# Patient Record
Sex: FEMALE | Race: BLACK OR AFRICAN AMERICAN | NOT HISPANIC OR LATINO
[De-identification: names, ages, dates, MRNs, and addresses within clinical notes are randomized per-mention and may not be internally consistent; named-entity substitution may affect disease eponyms.]

---

## 2017-01-19 ENCOUNTER — APPOINTMENT (OUTPATIENT)
Dept: INTERNAL MEDICINE | Facility: CLINIC | Age: 67
End: 2017-01-19

## 2017-01-19 VITALS — HEART RATE: 65 BPM | OXYGEN SATURATION: 98 % | DIASTOLIC BLOOD PRESSURE: 78 MMHG | SYSTOLIC BLOOD PRESSURE: 162 MMHG

## 2017-01-19 VITALS — SYSTOLIC BLOOD PRESSURE: 142 MMHG | DIASTOLIC BLOOD PRESSURE: 80 MMHG

## 2017-01-19 VITALS — WEIGHT: 168 LBS | BODY MASS INDEX: 26.31 KG/M2

## 2017-01-19 DIAGNOSIS — Z00.00 ENCOUNTER FOR GENERAL ADULT MEDICAL EXAMINATION W/OUT ABNORMAL FINDINGS: ICD-10-CM

## 2017-01-19 DIAGNOSIS — E87.6 HYPOKALEMIA: ICD-10-CM

## 2017-01-27 LAB
ANION GAP SERPL CALC-SCNC: 13 MMOL/L
BUN SERPL-MCNC: 13 MG/DL
CALCIUM SERPL-MCNC: 9.9 MG/DL
CHLORIDE SERPL-SCNC: 104 MMOL/L
CO2 SERPL-SCNC: 28 MMOL/L
CREAT SERPL-MCNC: 1.12 MG/DL
GLUCOSE SERPL-MCNC: 100 MG/DL
POTASSIUM SERPL-SCNC: 4.5 MMOL/L
SODIUM SERPL-SCNC: 145 MMOL/L

## 2017-02-07 LAB
CHOLEST SERPL-MCNC: 186 MG/DL
CHOLEST/HDLC SERPL: 3.2 RATIO
HDLC SERPL-MCNC: 59 MG/DL
LDLC SERPL CALC-MCNC: 111 MG/DL
TRIGL SERPL-MCNC: 82 MG/DL

## 2017-03-28 LAB
BASOPHILS # BLD AUTO: 0.02 K/UL
BASOPHILS NFR BLD AUTO: 0.4 %
EOSINOPHIL # BLD AUTO: 0.15 K/UL
EOSINOPHIL NFR BLD AUTO: 2.9 %
HBA1C MFR BLD HPLC: 5.2 %
HCT VFR BLD CALC: 41.2 %
HGB BLD-MCNC: 13 G/DL
IMM GRANULOCYTES NFR BLD AUTO: 0.4 %
LYMPHOCYTES # BLD AUTO: 1.75 K/UL
LYMPHOCYTES NFR BLD AUTO: 33.4 %
MAN DIFF?: NORMAL
MCHC RBC-ENTMCNC: 31.5 PG
MCHC RBC-ENTMCNC: 31.6 GM/DL
MCV RBC AUTO: 99.8 FL
MONOCYTES # BLD AUTO: 0.37 K/UL
MONOCYTES NFR BLD AUTO: 7.1 %
NEUTROPHILS # BLD AUTO: 2.93 K/UL
NEUTROPHILS NFR BLD AUTO: 55.8 %
PLATELET # BLD AUTO: 216 K/UL
RBC # BLD: 4.13 M/UL
RBC # FLD: 13.5 %
WBC # FLD AUTO: 5.24 K/UL

## 2018-03-13 ENCOUNTER — RX RENEWAL (OUTPATIENT)
Age: 68
End: 2018-03-13

## 2018-03-13 RX ORDER — ATORVASTATIN CALCIUM 40 MG/1
40 TABLET, FILM COATED ORAL
Qty: 90 | Refills: 3 | Status: ACTIVE | COMMUNITY
Start: 2017-01-19 | End: 1900-01-01

## 2018-03-16 ENCOUNTER — RX RENEWAL (OUTPATIENT)
Age: 68
End: 2018-03-16

## 2018-06-05 ENCOUNTER — RX RENEWAL (OUTPATIENT)
Age: 68
End: 2018-06-05

## 2018-09-22 ENCOUNTER — EMERGENCY (EMERGENCY)
Facility: HOSPITAL | Age: 68
LOS: 1 days | Discharge: HOME | End: 2018-09-22
Attending: EMERGENCY MEDICINE | Admitting: EMERGENCY MEDICINE
Payer: MEDICARE

## 2018-09-22 VITALS
SYSTOLIC BLOOD PRESSURE: 188 MMHG | HEART RATE: 88 BPM | TEMPERATURE: 98 F | RESPIRATION RATE: 18 BRPM | OXYGEN SATURATION: 97 % | DIASTOLIC BLOOD PRESSURE: 87 MMHG

## 2018-09-22 PROCEDURE — 99283 EMERGENCY DEPT VISIT LOW MDM: CPT | Mod: GC

## 2018-09-22 PROCEDURE — 73564 X-RAY EXAM KNEE 4 OR MORE: CPT | Mod: 26,RT

## 2018-09-22 NOTE — ED PROVIDER NOTE - MEDICAL DECISION MAKING DETAILS
68F w/ anterior R knee point tenderness, no limited ROM; clinically not consistent w/ septic knee, more likely bursitis, possible tendinitis, unlikely cellulitis; possible arthritis component; will check knee xr, likely dc w/ close outpt f/u and OTC pain meds

## 2018-09-22 NOTE — ED ADULT NURSE NOTE - NSIMPLEMENTINTERV_GEN_ALL_ED
Implemented All Universal Safety Interventions:  [place] to call system. Call bell, personal items and telephone within reach. Instruct patient to call for assistance. Room bathroom lighting operational. Non-slip footwear when patient is off stretcher. Physically safe environment: no spills, clutter or unnecessary equipment. Stretcher in lowest position, wheels locked, appropriate side rails in place.

## 2018-09-22 NOTE — ED ADULT NURSE NOTE - OBJECTIVE STATEMENT
The patient is a 68y Female complaining of R knee pain, swelling x 3 days. Pmh of HIV, CVA with R sided weakness, ambulatory independently, htn. Denies trauma, injury to the site. Seen by MARIANELA Oliva  IV accessed.  labs sent.  Swelling +, warm to touch, tenderness on palpation on anterior knee noted.  X-ray done,  pending result.  Pt refused pain med. The patient is a 68y Female complaining of R knee pain, swelling x 3 days. Pmh of HIV, CVA with R sided weakness, ambulatory independently, htn. Denies trauma, injury to the site. Seen by MARIANELA Oliva  Swelling +, warm to touch, tenderness on palpation on anterior knee noted.  X-ray done,  pending result.  Pt refused pain med.

## 2018-09-22 NOTE — ED PROVIDER NOTE - OBJECTIVE STATEMENT
68F w/ pmh CVA (R hand residual deficit, on asa), HTN, HLD p/w R knee pain and swelling x 3 days.     Pt had R knee arthroscopy for torn ligament in 1970s, no other procedures since then. 68F w/ pmh CVA (R hand residual deficit, on asa), HTN, HLD p/w R knee pain and swelling x 3 days. Pt denies     Pt had R knee arthroscopy for torn ligament in 1970s, no other procedures since then. 68F w/ pmh CVA (R hand residual deficit, on asa), HTN, HLD p/w R knee pain and swelling x 3 days. Pt denies trauma, injury, strain, other issues. No other complaints. No fever, systemic sx. Eating, drinking, urinating, stooling normally w/out issue. No recent travel, medication change, illness, or hospitalization.     Pt had R knee arthroscopy for torn ligament in 1970s, no other procedures since then.

## 2018-09-22 NOTE — ED PROVIDER NOTE - PROGRESS NOTE DETAILS
Mehdi Morrissey PGY2: pt still declining pain meds in ED - pending xr read, will dc Mehdi Morrissey PGY2: knee XR read by ED attending - shows arthritic changes and osteoporosis, no evidence of erosions, fracture, dislocation; will dc w/ outtpt f/u

## 2018-09-22 NOTE — ED PROVIDER NOTE - PHYSICAL EXAMINATION
*GEN:   comfortable, in no acute distress, AOx3    ///    *EYES:   pupils equally round and reactive to light, extra-occular movements intact    ///    *HEENT:   airway patent, moist mucosal membranes    ///    *CV:   regular rate and rhythm    ///    *RESP:   clear to auscultation bilaterally, non-labored    ///    *ABD:   soft, non-tender    ///    *:   no cva/flank tenderness    ///    *MSK:   R knee point anterior pre-patellar tenderness; no other tenderness elsewhere in knee, no decreased ROM    ///    *SKIN:   dry, intact    ///    *NEURO:   AOx3, no focal weakness or loss of sensation, gait normal

## 2018-09-22 NOTE — ED PROVIDER NOTE - ATTENDING CONTRIBUTION TO CARE
I was physically present for the E/M service provided. I agree with above history, physical, and plan which I have reviewed and edited where appropriate. I was physically present for the key portions of the service provided.    68F w/ pmh CVA (R hand residual deficit, on asa), HTN, HLD p/w R knee pain and swelling x 3 days. pt states no trauma, no fever, no instrumentation.  pt otherwise at baseline.    *GEN:   comfortable, in no apparent distress, AOx3  *EYES:   PERRL, extra-occular movements intact  *HEENT:   airway patent, moist mucosal membranes, uvula midline  *CV:   regular rate and rhythm, normal S1/S2, no murmur  *RESP:   clear to auscultation bilaterally, non-labored, speaking in full sentences  *ABD:   soft, non tender, no guarding  *MSK:   right lateral knee musculoskeletal tenderness has FROM with small area that is point tender, moving all extremity  *SKIN:   dry, intact, no rash  *NEURO:   AOx3, baseline right focal weakness or loss of sensation, gait normal to pt, GCS 15    a/p: atraumatic non-septic knee- likely arthritis r/o fracture vs dislocation vs contusion.  bursitis.  pt doesn't want any pain meds.  ace wrap and xr.  f/u with ortho   a/p:

## 2020-07-14 ENCOUNTER — INPATIENT (INPATIENT)
Facility: HOSPITAL | Age: 70
LOS: 2 days | Discharge: HOME | End: 2020-07-17
Attending: INTERNAL MEDICINE | Admitting: INTERNAL MEDICINE
Payer: MEDICARE

## 2020-07-14 VITALS
SYSTOLIC BLOOD PRESSURE: 162 MMHG | HEART RATE: 80 BPM | OXYGEN SATURATION: 100 % | RESPIRATION RATE: 18 BRPM | TEMPERATURE: 99 F | DIASTOLIC BLOOD PRESSURE: 94 MMHG

## 2020-07-14 LAB
ALBUMIN SERPL ELPH-MCNC: 4.1 G/DL — SIGNIFICANT CHANGE UP (ref 3.3–5)
ALP SERPL-CCNC: 71 U/L — SIGNIFICANT CHANGE UP (ref 40–120)
ALT FLD-CCNC: 9 U/L — SIGNIFICANT CHANGE UP (ref 4–33)
ANION GAP SERPL CALC-SCNC: 11 MMO/L — SIGNIFICANT CHANGE UP (ref 7–14)
APPEARANCE UR: CLEAR — SIGNIFICANT CHANGE UP
APTT BLD: 34.3 SEC — SIGNIFICANT CHANGE UP (ref 27–36.3)
AST SERPL-CCNC: 16 U/L — SIGNIFICANT CHANGE UP (ref 4–32)
BACTERIA # UR AUTO: NEGATIVE — SIGNIFICANT CHANGE UP
BASE EXCESS BLDV CALC-SCNC: 9.2 MMOL/L — SIGNIFICANT CHANGE UP
BASOPHILS # BLD AUTO: 0.03 K/UL — SIGNIFICANT CHANGE UP (ref 0–0.2)
BASOPHILS NFR BLD AUTO: 0.7 % — SIGNIFICANT CHANGE UP (ref 0–2)
BILIRUB SERPL-MCNC: 0.5 MG/DL — SIGNIFICANT CHANGE UP (ref 0.2–1.2)
BILIRUB UR-MCNC: NEGATIVE — SIGNIFICANT CHANGE UP
BLOOD GAS VENOUS - CREATININE: 1.15 MG/DL — SIGNIFICANT CHANGE UP (ref 0.5–1.3)
BLOOD GAS VENOUS - FIO2: 21 — SIGNIFICANT CHANGE UP
BLOOD UR QL VISUAL: SIGNIFICANT CHANGE UP
BUN SERPL-MCNC: 19 MG/DL — SIGNIFICANT CHANGE UP (ref 7–23)
CALCIUM SERPL-MCNC: 10 MG/DL — SIGNIFICANT CHANGE UP (ref 8.4–10.5)
CHLORIDE BLDV-SCNC: 103 MMOL/L — SIGNIFICANT CHANGE UP (ref 96–108)
CHLORIDE SERPL-SCNC: 102 MMOL/L — SIGNIFICANT CHANGE UP (ref 98–107)
CO2 SERPL-SCNC: 30 MMOL/L — SIGNIFICANT CHANGE UP (ref 22–31)
COLOR SPEC: SIGNIFICANT CHANGE UP
CREAT SERPL-MCNC: 1.1 MG/DL — SIGNIFICANT CHANGE UP (ref 0.5–1.3)
EOSINOPHIL # BLD AUTO: 0.09 K/UL — SIGNIFICANT CHANGE UP (ref 0–0.5)
EOSINOPHIL NFR BLD AUTO: 2 % — SIGNIFICANT CHANGE UP (ref 0–6)
GAS PNL BLDV: 144 MMOL/L — SIGNIFICANT CHANGE UP (ref 136–146)
GLUCOSE BLDV-MCNC: 122 MG/DL — HIGH (ref 70–99)
GLUCOSE SERPL-MCNC: 124 MG/DL — HIGH (ref 70–99)
GLUCOSE UR-MCNC: NEGATIVE — SIGNIFICANT CHANGE UP
HCO3 BLDV-SCNC: 30 MMOL/L — HIGH (ref 20–27)
HCT VFR BLD CALC: 37 % — SIGNIFICANT CHANGE UP (ref 34.5–45)
HCT VFR BLDV CALC: 39.6 % — SIGNIFICANT CHANGE UP (ref 34.5–45)
HGB BLD-MCNC: 12.3 G/DL — SIGNIFICANT CHANGE UP (ref 11.5–15.5)
HGB BLDV-MCNC: 12.9 G/DL — SIGNIFICANT CHANGE UP (ref 11.5–15.5)
HYALINE CASTS # UR AUTO: NEGATIVE — SIGNIFICANT CHANGE UP
IMM GRANULOCYTES NFR BLD AUTO: 0 % — SIGNIFICANT CHANGE UP (ref 0–1.5)
INR BLD: 1.13 — SIGNIFICANT CHANGE UP (ref 0.88–1.17)
KETONES UR-MCNC: NEGATIVE — SIGNIFICANT CHANGE UP
LACTATE BLDV-MCNC: 1.5 MMOL/L — SIGNIFICANT CHANGE UP (ref 0.5–2)
LEUKOCYTE ESTERASE UR-ACNC: NEGATIVE — SIGNIFICANT CHANGE UP
LYMPHOCYTES # BLD AUTO: 1.52 K/UL — SIGNIFICANT CHANGE UP (ref 1–3.3)
LYMPHOCYTES # BLD AUTO: 34.4 % — SIGNIFICANT CHANGE UP (ref 13–44)
MCHC RBC-ENTMCNC: 31.1 PG — SIGNIFICANT CHANGE UP (ref 27–34)
MCHC RBC-ENTMCNC: 33.2 % — SIGNIFICANT CHANGE UP (ref 32–36)
MCV RBC AUTO: 93.7 FL — SIGNIFICANT CHANGE UP (ref 80–100)
MONOCYTES # BLD AUTO: 0.56 K/UL — SIGNIFICANT CHANGE UP (ref 0–0.9)
MONOCYTES NFR BLD AUTO: 12.7 % — SIGNIFICANT CHANGE UP (ref 2–14)
NEUTROPHILS # BLD AUTO: 2.22 K/UL — SIGNIFICANT CHANGE UP (ref 1.8–7.4)
NEUTROPHILS NFR BLD AUTO: 50.2 % — SIGNIFICANT CHANGE UP (ref 43–77)
NITRITE UR-MCNC: NEGATIVE — SIGNIFICANT CHANGE UP
NRBC # FLD: 0 K/UL — SIGNIFICANT CHANGE UP (ref 0–0)
PCO2 BLDV: 56 MMHG — HIGH (ref 41–51)
PH BLDV: 7.4 PH — SIGNIFICANT CHANGE UP (ref 7.32–7.43)
PH UR: 7.5 — SIGNIFICANT CHANGE UP (ref 5–8)
PLATELET # BLD AUTO: 173 K/UL — SIGNIFICANT CHANGE UP (ref 150–400)
PMV BLD: 10.3 FL — SIGNIFICANT CHANGE UP (ref 7–13)
PO2 BLDV: < 24 MMHG — LOW (ref 35–40)
POTASSIUM BLDV-SCNC: 3.3 MMOL/L — LOW (ref 3.4–4.5)
POTASSIUM SERPL-MCNC: 3.8 MMOL/L — SIGNIFICANT CHANGE UP (ref 3.5–5.3)
POTASSIUM SERPL-SCNC: 3.8 MMOL/L — SIGNIFICANT CHANGE UP (ref 3.5–5.3)
PROT SERPL-MCNC: 7.2 G/DL — SIGNIFICANT CHANGE UP (ref 6–8.3)
PROT UR-MCNC: NEGATIVE — SIGNIFICANT CHANGE UP
PROTHROM AB SERPL-ACNC: 12.9 SEC — SIGNIFICANT CHANGE UP (ref 9.8–13.1)
RBC # BLD: 3.95 M/UL — SIGNIFICANT CHANGE UP (ref 3.8–5.2)
RBC # FLD: 12.5 % — SIGNIFICANT CHANGE UP (ref 10.3–14.5)
RBC CASTS # UR COMP ASSIST: SIGNIFICANT CHANGE UP (ref 0–?)
SAO2 % BLDV: 23.9 % — LOW (ref 60–85)
SODIUM SERPL-SCNC: 143 MMOL/L — SIGNIFICANT CHANGE UP (ref 135–145)
SP GR SPEC: 1.02 — SIGNIFICANT CHANGE UP (ref 1–1.04)
SQUAMOUS # UR AUTO: SIGNIFICANT CHANGE UP
TROPONIN T, HIGH SENSITIVITY: 17 NG/L — SIGNIFICANT CHANGE UP (ref ?–14)
TROPONIN T, HIGH SENSITIVITY: 18 NG/L — SIGNIFICANT CHANGE UP (ref ?–14)
UROBILINOGEN FLD QL: NORMAL — SIGNIFICANT CHANGE UP
WBC # BLD: 4.42 K/UL — SIGNIFICANT CHANGE UP (ref 3.8–10.5)
WBC # FLD AUTO: 4.42 K/UL — SIGNIFICANT CHANGE UP (ref 3.8–10.5)
WBC UR QL: SIGNIFICANT CHANGE UP (ref 0–?)

## 2020-07-14 PROCEDURE — 71045 X-RAY EXAM CHEST 1 VIEW: CPT | Mod: 26

## 2020-07-14 PROCEDURE — 70450 CT HEAD/BRAIN W/O DYE: CPT | Mod: 26,59

## 2020-07-14 PROCEDURE — 70496 CT ANGIOGRAPHY HEAD: CPT | Mod: 26

## 2020-07-14 PROCEDURE — 70498 CT ANGIOGRAPHY NECK: CPT | Mod: 26

## 2020-07-14 NOTE — ED ADULT TRIAGE NOTE - HEART RATE (BEATS/MIN)
Will continue current insulin regimen for now. Will continue monitoring FS, log, will Follow up.  Patient counseled for compliance with consistent low carb diet. 80

## 2020-07-14 NOTE — ED PROVIDER NOTE - OBJECTIVE STATEMENT
Ms. Lopez is a 68 yo F with a  PMH significant for CVA (2011) with R hemiplegia, dysarthria, and R facial droop at baseline presenting with worsening slurred speech starting at 5:00pm. The daughter was concerned for a new stroke, and called 911. In transit, BP was reportedly systolic of 200. Code stroke was called in the ED. Ms. Lopez is a 68 yo F with a  PMH significant for CVA (2011) with R hemiplegia, dysarthria, and R facial droop at baseline presenting with worsening slurred speech starting at 5:00pm. The daughter reports worsening slurred speech, and called 911. The patient denies any worsening slurred speech and only reported feeling "over heated." In transit, BP was reportedly systolic of 200. Code stroke was called in the ED. She takes aspirin at home and no anticoagulation. Presently, she denies any new symptoms other than feeling warm at 5:00pm. She is pain free.

## 2020-07-14 NOTE — CONSULT NOTE ADULT - ASSESSMENT
Impression: Transient episode of ?dysarthria now back to baseline. Etiology likely secondary to recrudesence of chronic lacunar infarcts vs. TIA.     Plan:   [] Continue ASA, statin  [] No inpatient need for further imaging inpatient  [] Can follow up with Dr. Shaun Reaves at [phone] 69F w/ PMH and CVA in 2011 (L. posterior limb of internal capsule w/ residual R. hemiparesis and dysarthria in 2011, on ASA), HTN, HLD, presents w/ multiple transient episode of worsening of baseline dysarthria.     Impression: Multiple stereotyped transient episodes of isolated dysarthria which is non-localizing. Etiology uncertain, possibilities include recrudescence of chronic lacunar infarcts vs. TIA vs. focal seizures. All of her chronic infarcts appear subcortical and restricted to the white matter so theoretically shouldn't cause a seizure focus.      Plan:   [] Keep BP permissive up to 220/110 for 48 hours followed by gradual normotension over 2-3 days   [] MRI brain without contrast   [] TTE w/ bubble   [] Load Plavix 300mg now. Start ASA + Plavix for 3 weeks followed by ASA alone as per [facility] protocol.   [] Atorvastatin 80mg (titrate to LDL < 70)   [] Lovenox SQ for DVT prophylaxis   [] PT/OT; S/S evaluation   [] Veeg for 24 hours; Duration depending on initial findings   [] Can follow up with Dr. Shaun Reaves at [phone] 69F w/ PMH and CVA in 2011 (L. posterior limb of internal capsule w/ residual R. hemiparesis and dysarthria in 2011, on ASA), HTN, HLD, presents w/ multiple transient episode of worsening of baseline dysarthria. LKN 1700 2020. NIHSS 8 (largely influenced by chronic findings). Exam as below. CTH/CTA w/o acute findings.     Exam:   MS: Oriented x3. Fluent. Follows crossed commands. Very subtle labial dysarthria.   CN: VFF. EOMI. V1-V3 intact. Face symmetric. T/u midline.   Motor: RUE contracted. RLE drift. Full strength left.   Sensory: Decreased sensation to LT on the left leg, otherwise sensation intact   Coordination: No dysmetria on FNF or ataxia on HTS on left, unable to check right given plegia  Gait: Deferred    CTH 2020: No acute pathology. Chronic lacunar infarcts in the L. Lentiform and corona radiata, with a new infarct in the L. genu of internal capsule compared to 2011 MRI.   CTA h/n 2020: No LVO. No steno-occlusive disease.     Impression: Multiple stereotyped transient episodes of isolated dysarthria which is non-localizing. Etiology uncertain, possibilities include recrudescence of chronic lacunar infarcts vs. TIA vs. focal seizures. All of her chronic infarcts appear subcortical and restricted to the white matter so theoretically shouldn't cause a seizure focus. If stroke/TIA, mechanism likely .     Plan:   [] Keep BP permissive up to 220/110 for 48 hours followed by gradual normotension over 2-3 days   [] MRI brain without contrast   [] TTE w/ bubble   [] Load Plavix 300mg now. Start ASA + Plavix for 3 weeks followed by ASA alone as per [facility] protocol.   [] Atorvastatin 80mg (titrate to LDL < 70)   [] Lovenox SQ for DVT prophylaxis   [] PT/OT; S/S evaluation   [] Veeg for 24 hours; Duration depending on initial findings   [] Can follow up with Dr. Shaun Reaves at [phone]

## 2020-07-14 NOTE — ED PROVIDER NOTE - PROGRESS NOTE DETAILS
Patient is asymptomatic. Comfortable in bed. CTA does not demonstrate any acute etiology. CXR is clear to author's interpretation. Neurology recommending c/w asa and statin with outpatient follow up. UA negative. Ready for discharge. Patient had a 10 minute episode of aphasia. She could follow commands (squeeze fingers, hold up 3 fingers when asked to hold 2). There was no convulsion or stereotypical movements. She was drooling out of the left side of her mouth. There was no worsening of her facial droop. She returned to baseline acutely. She states that she recalls that I was in the room previously and asked her to follow commands, but she does not seem to have insight into the fact that she was aphasic. Discussed with neurology, and low yield given that patient now returned to baseline for CTH. However, will draw prolactin for concern for seizure. Admit for MRI and possible EEG.

## 2020-07-14 NOTE — ED PROVIDER NOTE - CLINICAL SUMMARY MEDICAL DECISION MAKING FREE TEXT BOX
Ms. Lopez is a 69 F with a PMH CVA with R hemiparesis, dysarthria, and R facial droop presenting with subjective warmth and reported slurred speech. Following the code stroke, she has no symptoms different from her baseline. She has had a CTH that demonstrates a chronic stroke in the left putamen/corona radiata and redemonstrated r lacunar infarct from 2011. Given that she has returned to baseline, has no wbc and is afebrile without concerning CTH findings, this unlikely stroke or an emergent process. Will follow up read of CTA and likely discharge with continuation of home aspirin with outpatient neurology follow up. Will follow up neuro recs and CTA read.

## 2020-07-14 NOTE — ED PROVIDER NOTE - ATTENDING CONTRIBUTION TO CARE
68yo F ho CVA with residual r sided weakness and slurred speech, presents with worsening slurred speech per daughter since 5pm though pt states her speech is at her baseline. also says she felt overheated at the time. no other worsening weakness or numbness, no fevers, chills, nausea, vomiting, cough, abd pain, diarrhea, dysuria. on exam pt with r upper and lower weakness, rue contracted, strength and sensory normal in left side, no facial droop, - no new acute findings, will eval for possible infection vs metabolic etiology for stroke recrudescence though pt states she is at her baseline  will reassess after labs, urine and cxr

## 2020-07-14 NOTE — ED PROVIDER NOTE - NSFOLLOWUPINSTRUCTIONS_ED_ALL_ED_FT
You came in because you were feeling warm and might have been having slurred speech. We were worried about a stroke so we imaged your head. There was nothing emergent. You were not having another stroke. We wanted to make sure that you were not having an infection because you were feeling warm, but you did not have any fever here and your tests including blood and urine tests and chest xray were all negative for infections.    You are ready to go home.    Please continue taking your aspirin and statin at home.    Please follow up with Dr. Shaun Reaves at [phone] within 1 week of discharge from the ED. You came in because you were feeling warm and might have been having slurred speech. We were worried about a stroke so we imaged your head. There was nothing emergent. You were not having another stroke. We wanted to make sure that you were not having an infection because you were feeling warm, but you did not have any fever here and your tests including blood and urine tests and chest xray were all negative for infections.    You are ready to go home.    Please continue taking your aspirin and statin at home.    Please follow up with Dr. Shaun Reaves at [phone] within 1 week of discharge from the ED.    Please follow up with your primary care doctor regarding the mass on the left side of your face. You were provided with your imaging results with your discharge paperwork.

## 2020-07-14 NOTE — ED ADULT NURSE NOTE - PMH
Benign Essential Hypertension    CVA (cerebral vascular accident)    CVA, old, hemiparesis    HLD (hyperlipidemia)    HTN (hypertension)

## 2020-07-14 NOTE — ED PROVIDER NOTE - PATIENT PORTAL LINK FT
You can access the FollowMyHealth Patient Portal offered by [facility] by registering at the following website: [email or web address]. By joining [facility]’s FollowMyHealth portal, you will also be able to view your health information using other applications (apps) compatible with our system.

## 2020-07-14 NOTE — ED PROVIDER NOTE - NS ED ROS FT
REVIEW OF SYSTEMS:    CONSTITUTIONAL: Felt warm as in HPI  EYES/ENT: No visual changes;  No vertigo or throat pain   NECK: No pain or stiffness  RESPIRATORY: No cough, wheezing, hemoptysis; No shortness of breath  CARDIOVASCULAR: No chest pain or palpitations  GASTROINTESTINAL: No abdominal pain; nausea, vomiting;  diarrhea or constipation. No hemetemesis, melena or hematochezia.  GENITOURINARY: No dysuria, frequency or hematuria  NEUROLOGICAL: No numbness or weakness  SKIN: No itching, burning, rashes, or lesions   All other review of systems is negative unless indicated above.

## 2020-07-14 NOTE — ED ADULT NURSE NOTE - INTERVENTIONS DEFINITIONS
Monitor gait and stability/Stretcher in lowest position, wheels locked, appropriate side rails in place/Call bell, personal items and telephone within reach/Instruct patient to call for assistance/Non-slip footwear when patient is off stretcher

## 2020-07-14 NOTE — CONSULT NOTE ADULT - ATTENDING COMMENTS
69-year-old right-handed lady first evaluated at [facility] on 7/15/2020 with worsening dysarthria.  History and exam as above with minor changes.  ROS otherwise negative.  Exam.  Alert and attentive; mild-moderate right facial palsy; no dysarthria; right arm flexed with markedly increased tone, possibly with at least partial flexion contracture, proximally 2/5; right leg drift; sensory-intact to light touch and temperature including on left leg; gait not tested; remainder of neurologic exam was nonfocal.  CT head (7/14/2020) to my eye showed chronic lacunar infarcts: Left lentiform nucleus/corona radiata, and reportedly right corona radiata (not obvious to my eye).  CTA neck and head (7/14/2020) to my eye was unremarkable.  LDL (7/14/2020) = 127.  Impression.  She had a previous stroke, by her report in about 2007 with residual right hemiparesis and dysarthria.  On 7/14/2020, she developed worsening of her dysarthria with subsequent improvement to baseline.  Her presentation is consistent with cerebral dysfunction, localization indeterminate, but perhaps small-deep/subcortical, etiology uncertain, but perhaps recurrent ischemic stroke or TIA of uncertain mechanism, but possibly small vessel disease.  Suggest.  MRI brain can be done as inpatient or outpatient; elective TTE as inpatient or outpatient; Plavix 300 mg loading dose, then 75 mg daily for 3 weeks; continue aspirin 81 mg daily-indefinitely; atorvastatin 80 mg daily-Target LDL less than 70; PT/OT; from neurovascular standpoint, cleared for discharge.

## 2020-07-14 NOTE — ED ADULT NURSE NOTE - OBJECTIVE STATEMENT
Sukhwinder RN: Patient received at CT scan for Code stroke with c/o worsening slurred speech starting 5pm. Patient A&OX3, hx. CVA with right sided residuals. Right arm observed to be contracted and right sided facial droop observed. Per Patient this is baseline. Patient denies any blood thinners. 18G IV placed in left ac, labs drawn and sent. Report given to primary RN Abir. Patient to go to room 10.

## 2020-07-14 NOTE — ED PROVIDER NOTE - PHYSICAL EXAMINATION
PHYSICAL EXAM:    General: No acute distress. Sitting upright in bed.  HEENT: NCAT.  PERRL.  EOMI.  No scleral icterus or injection.  Moist MM.  No oropharyngeal exudates. There is a firm mass adjacent to the angle of the jaw that is nontender, nonerythematous (patient states chronic since 2011)  Neck: Supple.  Full ROM.  No JVD.  No thyromegaly. No lymphadenopathy.   Heart: RRR.  Normal S1 and S2.  No murmurs, rubs, or gallops.   Lungs: CTAB. No wheezes, crackles, or rhonchi.    Abdomen: BS+, soft, NT/ND.  No organomegaly.  Skin: Warm and dry.  No rashes.  Extremities: No edema, clubbing, or cyanosis.  2+ peripheral pulses b/l.  Musculoskeletal: Right upper extremity is contracted  Neuro: Alert and appropriately conversant. There is r nasolabial flattening. Right upper extremity is contracted. There is slight dysarthria. Slight extinction on left lower extremity.

## 2020-07-14 NOTE — ED ADULT TRIAGE NOTE - CHIEF COMPLAINT QUOTE
Pt Notification r/o stroke as per Daughter reporting at 5pm her speech was more slurred than usual ( she has hx of cva 2011 with rt sided deficits and slurred speech. . Delio to triage. [place] called pt to ct then room 10.

## 2020-07-14 NOTE — CONSULT NOTE ADULT - SUBJECTIVE AND OBJECTIVE BOX
ABIOLA TOVAR  Female  MRN-[id number]    HPI:  69F w/ PMH and CVA in 2011 (L. posterior limb of internal capsule w/ residual R. hemiparesis and dysarthria, on ASA), HTN, HLD, presents w/ worsening dysarthria.     Patient notes that her speech sounds different today. States she has previous history of stroke w/ dysarthria. No new findings.     Code stroke called on arrival. NIHSS 8. LKN 1700. Tpa not given as rapidly improving symptoms. Patient back to baseline on repeat examination.  Imaging below.     CTH: Chronic lacunar infarcts in the L. Lentiform and corona radiata.   CTA h/n: negaitve    NIHSS: 8  MRS: 2    ROS: All negative except as mentioned in HPI    PAST MEDICAL & SURGICAL HISTORY:  HLD (hyperlipidemia)  HTN (hypertension)  CVA (cerebral vascular accident)  CVA, old, hemiparesis  Benign Essential Hypertension  No significant past surgical history    MEDICATIONS  (STANDING):    MEDICATIONS  (PRN):    Allergies    No Known Allergies    Intolerances    VITAL SIGNS:  T(F): 98.2  HR: 75  BP: 195/81  RR: 17  SpO2: 100%    Exam:   MS: Oriented x3. Fluent. Follows crossed commands. Very subtle labial dysarthria.   CN: VFF. EOMI. V1-V3 intact. Face symmetric. T/u midline.   Motor: RUE contracted. RLE drift. Full strength left.   Sensory: Decreased senesation to LT on the left leg, otherwise sensation intact   Coordination: No dysmetria on FNF or ataxia on HTS on left, unable to check right given plegia  Gait: Deferred    LABS:                          12.3   4.42  )-----------( 173      ( 14 Jul 2020 21:21 )             37.0     07-14    143  |  102  |  19  ----------------------------<  124<H>  3.8   |  30  |  1.10    Ca    10.0      14 Jul 2020 21:21    TPro  7.2  /  Alb  4.1  /  TBili  0.5  /  DBili  x   /  AST  16  /  ALT  9   /  AlkPhos  71  07-14    PT/INR - ( 14 Jul 2020 21:21 )   PT: 12.9 SEC;   INR: 1.13          PTT - ( 14 Jul 2020 21:21 )  PTT:34.3 SEC    RADIOLOGY & ADDITIONAL STUDIES: ABIOLA TOVAR  Female  MRN-[id number]    HPI:  69 RHF w/ PMH and CVA in 2011 (L. posterior limb of internal capsule w/ residual R. hemiparesis and dysarthria in 2011, on ASA), HTN, HLD, presents w/ worsening dysarthria.     Patient notes that her speech sounds different today. States she has previous history of stroke w/ baseline speech difficulties but believes speech has been worse. Denies any worsening of her baseline right sided weakness or additional complaints. Of note, while in the ED, patient had another episode of slurred speech lasting ~10 minutes as reported by ED staff. She is now back to baseline. Has history of multiple subcortical infarcts in the past. On ASA.     Code stroke called on arrival. NIHSS 8. LKN 1700. Tpa not given as rapidly improving symptoms. Patient back to baseline on repeat examination (as reported by herself).  Imaging below.     CTH 7.14.2020: No acute pathology. Chronic lacunar infarcts in the L. Lentiform and corona radiata, with a new infarct in the L. genu of internal capsule compared to 2011 MRI.   CTA h/n 7.14.2020: No LVO. No steno-occlusive disease.     NIHSS: 8  MRS: 2    ROS: All negative except as mentioned in HPI    PAST MEDICAL & SURGICAL HISTORY:  HLD (hyperlipidemia)  HTN (hypertension)  CVA (cerebral vascular accident)  CVA, old, hemiparesis  Benign Essential Hypertension  No significant past surgical history    MEDICATIONS  (STANDING):    MEDICATIONS  (PRN):    Allergies    No Known Allergies    Intolerances    VITAL SIGNS:  T(F): 98.2  HR: 75  BP: 195/81  RR: 17  SpO2: 100%    Exam:   MS: Oriented x3. Fluent. Follows crossed commands. Very subtle labial dysarthria.   CN: VFF. EOMI. V1-V3 intact. Face symmetric. T/u midline.   Motor: RUE contracted. RLE drift. Full strength left.   Sensory: Decreased sensation to LT on the left leg, otherwise sensation intact   Coordination: No dysmetria on FNF or ataxia on HTS on left, unable to check right given plegia  Gait: Deferred    LABS:                          12.3   4.42  )-----------( 173      ( 14 Jul 2020 21:21 )             37.0     07-14    143  |  102  |  19  ----------------------------<  124<H>  3.8   |  30  |  1.10    Ca    10.0      14 Jul 2020 21:21    TPro  7.2  /  Alb  4.1  /  TBili  0.5  /  DBili  x   /  AST  16  /  ALT  9   /  AlkPhos  71  07-14    PT/INR - ( 14 Jul 2020 21:21 )   PT: 12.9 SEC;   INR: 1.13          PTT - ( 14 Jul 2020 21:21 )  PTT:34.3 SEC    RADIOLOGY & ADDITIONAL STUDIES:

## 2020-07-15 DIAGNOSIS — Z29.9 ENCOUNTER FOR PROPHYLACTIC MEASURES, UNSPECIFIED: ICD-10-CM

## 2020-07-15 DIAGNOSIS — R22.1 LOCALIZED SWELLING, MASS AND LUMP, NECK: ICD-10-CM

## 2020-07-15 DIAGNOSIS — I10 ESSENTIAL (PRIMARY) HYPERTENSION: ICD-10-CM

## 2020-07-15 DIAGNOSIS — Z02.9 ENCOUNTER FOR ADMINISTRATIVE EXAMINATIONS, UNSPECIFIED: ICD-10-CM

## 2020-07-15 DIAGNOSIS — E78.5 HYPERLIPIDEMIA, UNSPECIFIED: ICD-10-CM

## 2020-07-15 DIAGNOSIS — I69.359 HEMIPLEGIA AND HEMIPARESIS FOLLOWING CEREBRAL INFARCTION AFFECTING UNSPECIFIED SIDE: ICD-10-CM

## 2020-07-15 DIAGNOSIS — I63.9 CEREBRAL INFARCTION, UNSPECIFIED: ICD-10-CM

## 2020-07-15 LAB
ANION GAP SERPL CALC-SCNC: 11 MMO/L — SIGNIFICANT CHANGE UP (ref 7–14)
ANION GAP SERPL CALC-SCNC: 12 MMO/L — SIGNIFICANT CHANGE UP (ref 7–14)
BUN SERPL-MCNC: 12 MG/DL — SIGNIFICANT CHANGE UP (ref 7–23)
BUN SERPL-MCNC: 14 MG/DL — SIGNIFICANT CHANGE UP (ref 7–23)
CALCIUM SERPL-MCNC: 9.7 MG/DL — SIGNIFICANT CHANGE UP (ref 8.4–10.5)
CALCIUM SERPL-MCNC: 9.8 MG/DL — SIGNIFICANT CHANGE UP (ref 8.4–10.5)
CHLORIDE SERPL-SCNC: 102 MMOL/L — SIGNIFICANT CHANGE UP (ref 98–107)
CHLORIDE SERPL-SCNC: 103 MMOL/L — SIGNIFICANT CHANGE UP (ref 98–107)
CHOLEST SERPL-MCNC: 189 MG/DL — SIGNIFICANT CHANGE UP (ref 120–199)
CO2 SERPL-SCNC: 28 MMOL/L — SIGNIFICANT CHANGE UP (ref 22–31)
CO2 SERPL-SCNC: 30 MMOL/L — SIGNIFICANT CHANGE UP (ref 22–31)
CREAT SERPL-MCNC: 0.84 MG/DL — SIGNIFICANT CHANGE UP (ref 0.5–1.3)
CREAT SERPL-MCNC: 0.92 MG/DL — SIGNIFICANT CHANGE UP (ref 0.5–1.3)
GLUCOSE BLDC GLUCOMTR-MCNC: 144 MG/DL — HIGH (ref 70–99)
GLUCOSE SERPL-MCNC: 118 MG/DL — HIGH (ref 70–99)
GLUCOSE SERPL-MCNC: 97 MG/DL — SIGNIFICANT CHANGE UP (ref 70–99)
HBA1C BLD-MCNC: 5.2 % — SIGNIFICANT CHANGE UP (ref 4–5.6)
HCT VFR BLD CALC: 36.3 % — SIGNIFICANT CHANGE UP (ref 34.5–45)
HDLC SERPL-MCNC: 53 MG/DL — SIGNIFICANT CHANGE UP (ref 45–65)
HGB BLD-MCNC: 12.2 G/DL — SIGNIFICANT CHANGE UP (ref 11.5–15.5)
LIPID PNL WITH DIRECT LDL SERPL: 127 MG/DL — SIGNIFICANT CHANGE UP
MAGNESIUM SERPL-MCNC: 2 MG/DL — SIGNIFICANT CHANGE UP (ref 1.6–2.6)
MAGNESIUM SERPL-MCNC: 2 MG/DL — SIGNIFICANT CHANGE UP (ref 1.6–2.6)
MCHC RBC-ENTMCNC: 30.9 PG — SIGNIFICANT CHANGE UP (ref 27–34)
MCHC RBC-ENTMCNC: 33.6 % — SIGNIFICANT CHANGE UP (ref 32–36)
MCV RBC AUTO: 91.9 FL — SIGNIFICANT CHANGE UP (ref 80–100)
NRBC # FLD: 0 K/UL — SIGNIFICANT CHANGE UP (ref 0–0)
PHOSPHATE SERPL-MCNC: 2.2 MG/DL — LOW (ref 2.5–4.5)
PHOSPHATE SERPL-MCNC: 3.4 MG/DL — SIGNIFICANT CHANGE UP (ref 2.5–4.5)
PLATELET # BLD AUTO: 173 K/UL — SIGNIFICANT CHANGE UP (ref 150–400)
PMV BLD: 10.9 FL — SIGNIFICANT CHANGE UP (ref 7–13)
POTASSIUM SERPL-MCNC: 2.4 MMOL/L — CRITICAL LOW (ref 3.5–5.3)
POTASSIUM SERPL-MCNC: 3.5 MMOL/L — SIGNIFICANT CHANGE UP (ref 3.5–5.3)
POTASSIUM SERPL-SCNC: 2.4 MMOL/L — CRITICAL LOW (ref 3.5–5.3)
POTASSIUM SERPL-SCNC: 3.5 MMOL/L — SIGNIFICANT CHANGE UP (ref 3.5–5.3)
PROLACTIN SERPL-MCNC: 5.4 NG/ML — SIGNIFICANT CHANGE UP (ref 3.4–24.1)
RBC # BLD: 3.95 M/UL — SIGNIFICANT CHANGE UP (ref 3.8–5.2)
RBC # FLD: 12.7 % — SIGNIFICANT CHANGE UP (ref 10.3–14.5)
SARS-COV-2 IGG SERPL QL IA: NEGATIVE — SIGNIFICANT CHANGE UP
SARS-COV-2 IGM SERPL IA-ACNC: <3.8 AU/ML — SIGNIFICANT CHANGE UP
SARS-COV-2 RNA SPEC QL NAA+PROBE: SIGNIFICANT CHANGE UP
SODIUM SERPL-SCNC: 142 MMOL/L — SIGNIFICANT CHANGE UP (ref 135–145)
SODIUM SERPL-SCNC: 144 MMOL/L — SIGNIFICANT CHANGE UP (ref 135–145)
TRIGL SERPL-MCNC: 71 MG/DL — SIGNIFICANT CHANGE UP (ref 10–149)
TSH SERPL-MCNC: 3.47 UIU/ML — SIGNIFICANT CHANGE UP (ref 0.27–4.2)
WBC # BLD: 4.23 K/UL — SIGNIFICANT CHANGE UP (ref 3.8–10.5)
WBC # FLD AUTO: 4.23 K/UL — SIGNIFICANT CHANGE UP (ref 3.8–10.5)

## 2020-07-15 PROCEDURE — 99223 1ST HOSP IP/OBS HIGH 75: CPT

## 2020-07-15 PROCEDURE — 99285 EMERGENCY DEPT VISIT HI MDM: CPT

## 2020-07-15 PROCEDURE — 70551 MRI BRAIN STEM W/O DYE: CPT | Mod: 26

## 2020-07-15 RX ORDER — CLOPIDOGREL BISULFATE 75 MG/1
300 TABLET, FILM COATED ORAL ONCE
Refills: 0 | Status: COMPLETED | OUTPATIENT
Start: 2020-07-15 | End: 2020-07-15

## 2020-07-15 RX ORDER — LABETALOL HCL 100 MG
200 TABLET ORAL DAILY
Refills: 0 | Status: DISCONTINUED | OUTPATIENT
Start: 2020-07-15 | End: 2020-07-16

## 2020-07-15 RX ORDER — ASPIRIN/CALCIUM CARB/MAGNESIUM 324 MG
1 TABLET ORAL
Qty: 0 | Refills: 0 | DISCHARGE

## 2020-07-15 RX ORDER — LABETALOL HCL 100 MG
1 TABLET ORAL
Qty: 0 | Refills: 0 | DISCHARGE

## 2020-07-15 RX ORDER — AMLODIPINE BESYLATE 2.5 MG/1
1 TABLET ORAL
Qty: 0 | Refills: 0 | DISCHARGE

## 2020-07-15 RX ORDER — ASPIRIN/CALCIUM CARB/MAGNESIUM 324 MG
81 TABLET ORAL DAILY
Refills: 0 | Status: DISCONTINUED | OUTPATIENT
Start: 2020-07-15 | End: 2020-07-17

## 2020-07-15 RX ORDER — POTASSIUM CHLORIDE 20 MEQ
10 PACKET (EA) ORAL
Refills: 0 | Status: COMPLETED | OUTPATIENT
Start: 2020-07-15 | End: 2020-07-15

## 2020-07-15 RX ORDER — AMLODIPINE BESYLATE 2.5 MG/1
10 TABLET ORAL AT BEDTIME
Refills: 0 | Status: DISCONTINUED | OUTPATIENT
Start: 2020-07-15 | End: 2020-07-17

## 2020-07-15 RX ORDER — HEPARIN SODIUM 5000 [USP'U]/ML
5000 INJECTION INTRAVENOUS; SUBCUTANEOUS EVERY 12 HOURS
Refills: 0 | Status: DISCONTINUED | OUTPATIENT
Start: 2020-07-15 | End: 2020-07-17

## 2020-07-15 RX ORDER — SODIUM CHLORIDE 9 MG/ML
3 INJECTION INTRAMUSCULAR; INTRAVENOUS; SUBCUTANEOUS EVERY 8 HOURS
Refills: 0 | Status: DISCONTINUED | OUTPATIENT
Start: 2020-07-15 | End: 2020-07-17

## 2020-07-15 RX ORDER — POTASSIUM CHLORIDE 20 MEQ
40 PACKET (EA) ORAL ONCE
Refills: 0 | Status: COMPLETED | OUTPATIENT
Start: 2020-07-15 | End: 2020-07-15

## 2020-07-15 RX ORDER — ATORVASTATIN CALCIUM 80 MG/1
80 TABLET, FILM COATED ORAL AT BEDTIME
Refills: 0 | Status: DISCONTINUED | OUTPATIENT
Start: 2020-07-15 | End: 2020-07-17

## 2020-07-15 RX ADMIN — SODIUM CHLORIDE 3 MILLILITER(S): 9 INJECTION INTRAMUSCULAR; INTRAVENOUS; SUBCUTANEOUS at 21:35

## 2020-07-15 RX ADMIN — ATORVASTATIN CALCIUM 80 MILLIGRAM(S): 80 TABLET, FILM COATED ORAL at 21:35

## 2020-07-15 RX ADMIN — Medication 100 MILLIEQUIVALENT(S): at 14:50

## 2020-07-15 RX ADMIN — SODIUM CHLORIDE 3 MILLILITER(S): 9 INJECTION INTRAMUSCULAR; INTRAVENOUS; SUBCUTANEOUS at 06:36

## 2020-07-15 RX ADMIN — Medication 100 MILLIEQUIVALENT(S): at 12:06

## 2020-07-15 RX ADMIN — CLOPIDOGREL BISULFATE 300 MILLIGRAM(S): 75 TABLET, FILM COATED ORAL at 06:46

## 2020-07-15 RX ADMIN — Medication 100 MILLIEQUIVALENT(S): at 10:55

## 2020-07-15 RX ADMIN — Medication 40 MILLIEQUIVALENT(S): at 10:55

## 2020-07-15 RX ADMIN — HEPARIN SODIUM 5000 UNIT(S): 5000 INJECTION INTRAVENOUS; SUBCUTANEOUS at 17:39

## 2020-07-15 RX ADMIN — Medication 81 MILLIGRAM(S): at 10:55

## 2020-07-15 RX ADMIN — HEPARIN SODIUM 5000 UNIT(S): 5000 INJECTION INTRAVENOUS; SUBCUTANEOUS at 06:46

## 2020-07-15 RX ADMIN — SODIUM CHLORIDE 3 MILLILITER(S): 9 INJECTION INTRAMUSCULAR; INTRAVENOUS; SUBCUTANEOUS at 14:50

## 2020-07-15 NOTE — H&P ADULT - PROBLEM SELECTOR PLAN 2
- Patient with large left neck mass, says she has had it for years and never had it worked-up.  - Mass is non-tender, hard   - Obtain more collateral from daughter/PMD in AM regarding mass

## 2020-07-15 NOTE — H&P ADULT - PROBLEM SELECTOR PLAN 4
- Continue statin, titrate down pending lipid profile   - lipid profile   - Low fat/ low cholesterol diet

## 2020-07-15 NOTE — ED ADULT NURSE REASSESSMENT NOTE - NS ED NURSE REASSESS COMMENT FT1
Break coverage- Pt speech now improved with no drooling noted. Pt able to speak in full sentences. Dr. Banerjee brought back to bedside and states pt is now back at baseline. Will continue to closely monitor. Break coverage- Pt speech now improved with no drooling noted. AA0X4. Pt able to speak in full sentences. Dr. Banerjee brought back to bedside and states pt is now back at baseline. Will continue to closely monitor.

## 2020-07-15 NOTE — PHYSICAL THERAPY INITIAL EVALUATION ADULT - GENERAL OBSERVATIONS, REHAB EVAL
Pt received semisupine on stretcher in ESSU 1, in no apparent distress. Pt agreeable to participate in physical therapy evaluation.

## 2020-07-15 NOTE — OCCUPATIONAL THERAPY INITIAL EVALUATION ADULT - LIVES WITH, PROFILE
Patient lives in a private home alone with 4 steps to enter + 18 steps to bedroom. Patient reports having a tub shower with no durable medical equipment.

## 2020-07-15 NOTE — ED ADULT NURSE REASSESSMENT NOTE - NS ED NURSE REASSESS COMMENT FT1
Assumed care, Pt resting comfortably in stretcher, VSS. No needs at this time, awaiting bed assignment. Will continue to monitor.

## 2020-07-15 NOTE — H&P ADULT - NSHPSOCIALHISTORY_GEN_ALL_CORE
Pt lives alone, all ADLs on her own.   Walks w/o assistance  no tobacco, alcohol or illicit drug use.

## 2020-07-15 NOTE — PROGRESS NOTE ADULT - PROBLEM SELECTOR PLAN 1
- Neuro c/s appreciated   - S/P Plavix load 300mg , then 75qd   - MRI brain w/o contrast ordered   - TTE w/ bubble study   - vEEG x 24hrs   - Neuro checks q4hrs, stoke checks per routine   - permissive HTN   - ASA 81  - Lipitor 80, titrate down pending Lipid profile  - passed dysphagia screen, purred diet for now   - S&S eval  - seizure, aspiration precautions  - fall precautions   - PT/OT eval

## 2020-07-15 NOTE — SPEECH LANGUAGE PATHOLOGY EVALUATION - COMMENTS
H&P: H&P: 70yo Female who ambulates without assistance w/ PMHx of CVA (- residual right sided hemiparesis, right sided facial droop and dysarthria) who presented with slurred speech and aphasia. Admitted for CVA work-up.    Neurology Note 20: Multiple stereotyped transient episodes of isolated dysarthria which is non-localizing. Etiology uncertain, possibilities include recrudescence of chronic lacunar infarcts vs. TIA vs. focal seizures. All of her chronic infarcts appear subcortical and restricted to the white matter so theoretically shouldn't cause a seizure focus. If stroke/TIA, mechanism likely .     Clinical bedside swallow evaluation completed on 7/15/20 (see note for details).     Patient was seen seated upright at bedside. Patient was alert/awake and fully cooperative. Patient endorses dysarthria since first CVA in  and that she is at her baseline speech abilities. Patient may benefit from speech therapy pending discharge should there be decline in speech intelligibility from baseline (rehabilitation center vs home care vs outpatient vs [facility] [phone])

## 2020-07-15 NOTE — PHYSICAL THERAPY INITIAL EVALUATION ADULT - ADDITIONAL COMMENTS
Pt lives in a house with 4 exterior steps to enter. There are 18 steps within home. Pt lives alone. Prior to admission, pt reported ambulating independently, no assistive device.    Pt was left semisupine on stretcher as found, all lines intact and call bell within reach, RN aware.

## 2020-07-15 NOTE — H&P ADULT - ATTENDING COMMENTS
Pt reports difficulty speaking is resolved, and weakness is at baseline from prior CVA.  No new neurologic symptoms.  On exam, Pt with R facial droop, and RUE contracted with strength 2-/5.  RLE 3/5  Reviewed labs and CT  EKG reviewed showing sinus with RBBB    CVA:  Reviewed neurology consult  Check MRI, TTE, EEG  monitor on tele  permissive HTN, then add back labetalol and amlodipine    Parotid mass:  Pt states her PMD Dr Turner has arranged for her to see a specialist (possibly ENT) but she has not been able see in office yet 2/2 pandemic.

## 2020-07-15 NOTE — H&P ADULT - ASSESSMENT
This is a 68yo Female who ambulates without assistance w/ PMHx of CVA (2011- residual right sided hemiparesis, right sided facial droop and dysarthria) who presented with slurred speech and aphasia. Admitted for CVA work-up.

## 2020-07-15 NOTE — SWALLOW BEDSIDE ASSESSMENT ADULT - ASR SWALLOW ASPIRATION MONITOR
oral hygiene/change of breathing pattern/throat clearing/position upright (90Y)/cough/upper respiratory infection/fever/pneumonia/gurgly voice

## 2020-07-15 NOTE — PHYSICAL THERAPY INITIAL EVALUATION ADULT - RANGE OF MOTION EXAMINATION, REHAB EVAL
bilateral lower extremity ROM was WFL (within functional limits)/See OT evaluation for UE assessment

## 2020-07-15 NOTE — PROGRESS NOTE ADULT - ASSESSMENT
This is a 70yo Female who ambulates without assistance w/ PMHx of CVA (2011- residual right sided hemiparesis, right sided facial droop and dysarthria) who presented with slurred speech and aphasia. Admitted for CVA work-up.

## 2020-07-15 NOTE — ED ADULT NURSE REASSESSMENT NOTE - NS ED NURSE REASSESS COMMENT FT1
Break coverage- Pt resting comfortably. Remains [id number]. 20G placed to left AC, lab sent. Will monitor.

## 2020-07-15 NOTE — H&P ADULT - PROBLEM SELECTOR PLAN 6
1.  Name of PCP:  2.  PCP Contacted on Admission: [ ] Y    [ x] N  night admit   3.  PCP contacted at Discharge: [ ] Y    [ ] N    [ ] N/A  4.  Post-Discharge Appointment Date and Location:  5.  Summary of Handoff given to PCP: 1.  Name of PCP: Dr Turner  2.  PCP Contacted on Admission: [ ] Y    [ x] N  night admit   3.  PCP contacted at Discharge: [ ] Y    [ ] N    [ ] N/A  4.  Post-Discharge Appointment Date and Location:  5.  Summary of Handoff given to PCP:

## 2020-07-15 NOTE — H&P ADULT - MUSCULOSKELETAL
details… detailed exam no calf tenderness/no joint erythema/no joint swelling/no joint warmth/ROM intact

## 2020-07-15 NOTE — ED ADULT NURSE REASSESSMENT NOTE - NS ED NURSE REASSESS COMMENT FT1
Break coverage- Pt was d/c and while pt was getting changed into clothes to go home she began drooling with garbled speech. Dr. Banerjee made aware, brought to bedside. Pt with acute change in neuro status, neurology to be notified by MD. Pt to be admitted rather than d/c at this time. NSR on cardiac monitor.

## 2020-07-15 NOTE — H&P ADULT - NSICDXPASTMEDICALHX_GEN_ALL_CORE_FT
PAST MEDICAL HISTORY:  Benign Essential Hypertension     CVA (cerebral vascular accident)     CVA, old, hemiparesis right sided    HLD (hyperlipidemia)     HTN (hypertension)

## 2020-07-15 NOTE — OCCUPATIONAL THERAPY INITIAL EVALUATION ADULT - PERTINENT HX OF CURRENT PROBLEM, REHAB EVAL
69 year old female with history of CVA (2011- residual right sided hemiparesis, right sided facial droop and dysarthria) who presented with worsening slurred speech. CT head showed no acute findings. Patient admitted for further CVA work up.

## 2020-07-15 NOTE — H&P ADULT - NEGATIVE ENMT SYMPTOMS
no vertigo/no hearing difficulty/no nasal discharge/no sinus symptoms/no tinnitus/no nasal congestion

## 2020-07-15 NOTE — H&P ADULT - PROBLEM SELECTOR PLAN 1
- Neuro c/s appreciated   - CTH c/w old chronic infarcts  - MRI brain w/o contrast ordered   - TTE w/ bubble study   - Neuro checks q4hrs, stoke checks per routine   - permissive HTN   - ASA 81  - Lipitor 80, titrate down pending Lipid profile   -passed dysphagia screen, purred diet for now   - S&S eval  - seizure, aspiration precautions  - fall precautions   - PT/OT eval - Neuro c/s appreciated   - Plavix load 300mg stat now, then 75qd   - MRI brain w/o contrast ordered   - TTE w/ bubble study   - vEEG x 24hrs   - Neuro checks q4hrs, stoke checks per routine   - permissive HTN   - ASA 81  - Lipitor 80, titrate down pending Lipid profile  - passed dysphagia screen, purred diet for now   - S&S eval  - seizure, aspiration precautions  - fall precautions   - PT/OT eval

## 2020-07-15 NOTE — OCCUPATIONAL THERAPY INITIAL EVALUATION ADULT - ANTICIPATED DISCHARGE DISPOSITION, OT EVAL
Anticipating home with no OT needs following discharge. OT to continue to follow while inpatient. Patient appears to be at/close to baseline.

## 2020-07-15 NOTE — H&P ADULT - RS GEN PE MLT RESP DETAILS PC
Patient Seen in: [facility] AND [facility] Emergency Department    History   Patient presents with:  Arrythmia/Palpitations (cardiovascular)    Stated Complaint: palpitations, high bp, pressure in head, anxious    HPI    Patient is a 68-year-old female who prese (Oral)   Resp 12   Ht 154.9 cm (5' 1\")   Wt 62.6 kg   SpO2 97%   BMI 26.07 kg/m²         Physical Exam    GENERAL: No acute distress, awake and alert.  Pt appears anxious  HEENT: MMM, EOMI, PERRL  Neck: supple, non tender  CV: RRR, no murmurs  Resp: CTAB, (cpt=71045)    Result Date: 7/24/2019  CONCLUSION: Normal examination.      Dictated by (CST): Tesfaye Hogan MD on 7/24/2019 at 12:13     Approved by (CST): Tesfaye Hogan MD on 7/24/2019 at 12:14            Radiology exams  Viewed and reviewed by myself a airway patent/good air movement/breath sounds equal/clear to auscultation bilaterally

## 2020-07-15 NOTE — PHYSICAL THERAPY INITIAL EVALUATION ADULT - DISCHARGE DISPOSITION, PT EVAL
Anticipate discharge home; no skilled physical therapy needs. Pt appears to be functioning at baseline level. Will follow while inpatient.

## 2020-07-15 NOTE — SWALLOW BEDSIDE ASSESSMENT ADULT - SWALLOW EVAL: DIAGNOSIS
Patient presents with functional oropharyngeal swallow characterized by adequate stripping of bolus from utensil, adequate oral containment, adequate mastication for solids, adequate bolus manipulation and functional oral transit time. There was laryngeal elevation upon digital palpation with no overt s/s of laryngeal penetration/aspiration for puree consistency, solids, honey thick liquid, nectar thick liquid and thin liquid trials.

## 2020-07-15 NOTE — PROGRESS NOTE ADULT - PROBLEM SELECTOR PLAN 6
1.  Name of PCP: Dr Turner  2.  PCP Contacted on Admission: [ ] Y    [ x] N  night admit   3.  PCP contacted at Discharge: [ ] Y    [ ] N    [ ] N/A  4.  Post-Discharge Appointment Date and Location:  5.  Summary of Handoff given to PCP:

## 2020-07-15 NOTE — SWALLOW BEDSIDE ASSESSMENT ADULT - COMMENTS
H&P: 68yo Female who ambulates without assistance w/ PMHx of CVA (2011- residual right sided hemiparesis, right sided facial droop and dysarthria) who presented with slurred speech and aphasia. Admitted for CVA work-up.     CXR 7/14/20: clear lungs  CTH 7/4/20: no acute intracranial hemorrhage    Speech and Language Evaluation completed on 7/15/20 (see note for details).     Patient was seen seated upright at bedside. Patient was alert/awake and verbally responsive to simple questions. Patient able to follow simple directions. Patient denies dysphagia symptoms at this time.

## 2020-07-15 NOTE — OCCUPATIONAL THERAPY INITIAL EVALUATION ADULT - GENERAL OBSERVATIONS, REHAB EVAL
Patient received semisupine on stretcher in NAD. Per RN Faiza, patient okay to participate in OT evaluation. +right UE contractures with wrist brace.

## 2020-07-15 NOTE — PHYSICAL THERAPY INITIAL EVALUATION ADULT - PATIENT PROFILE REVIEW, REHAB EVAL
yes/PT orders received: Out of bed with assistance. Consult with RN Faiza GARCIA, patient may participate in PT evaluation.

## 2020-07-15 NOTE — CONSULT NOTE ADULT - SUBJECTIVE AND OBJECTIVE BOX
CHIEF COMPLAINT:Patient is a 69y old  Female who presents with a chief complaint of CVA (15 Jul 2020 14:27)      HISTORY OF PRESENT ILLNESS:HPI:  This is a 68yo Female who ambulates without assistance w/ PMHx of CVA (2011- residual right sided hemiparesis, right sided facial droop and dysarthria) who presented with worsening slurred speech since 5pm yesterday evening. Pt's daughter noticed the slurred speech, the patient only remembers her speech sounding different and feeling "warm." Pt's BP was elevated at the time SBP 200s via EMS. Code Stroke was called, once patient arrived, her speech quickly returned back to baseline, CTH was unremarkable for new findings, and patient was planned for DC home w/ outpatient neuro follow-up. While waiting for DC, the patient then had a witnessed 10 minute episode of aphasia, with drool coming out of left side of mouth. No convulsions, shaking or incontinence noted. No worsening of her right sided facial droop or right sided hemiparesis. After 10 minutes, pt returned back to her baseline. Patient partially remembers the episode says she remembers not being able to talk, denies any precipitating factors such as visual changes, dizziness, numbness, weakness, HA, chest pain or palpitations. Neuro recommended admission w/ MR brain and possible EEG. Patient currently denying seizure history, fever, chills, SOB, cough, abdominal pain, N/V/D/C, dysuria, hematuria, melena or hematochezia. (15 Jul 2020 05:11)      PAST MEDICAL & SURGICAL HISTORY:  HLD (hyperlipidemia)  HTN (hypertension)  CVA (cerebral vascular accident)  CVA, old, hemiparesis: right sided  Benign Essential Hypertension  No significant past surgical history          MEDICATIONS:  amLODIPine   Tablet 10 milliGRAM(s) Oral at bedtime  aspirin enteric coated 81 milliGRAM(s) Oral daily  heparin   Injectable 5000 Unit(s) SubCutaneous every 12 hours  labetalol 200 milliGRAM(s) Oral daily            atorvastatin 80 milliGRAM(s) Oral at bedtime    sodium chloride 0.9% lock flush 3 milliLiter(s) IV Push every 8 hours      FAMILY HISTORY:  FH: HTN (hypertension)      Non-contributory    SOCIAL HISTORY:    not a smoker    Allergies    No Known Allergies    Intolerances    	    REVIEW OF SYSTEMS:  CONSTITUTIONAL: No fever  EYES: No eye pain, visual disturbances, or discharge  ENMT:  No difficulty hearing, tinnitus  NECK: No pain or stiffness  RESPIRATORY: No cough, wheezing,  CARDIOVASCULAR: No chest pain, palpitations, passing out, dizziness, or leg swelling  GASTROINTESTINAL:  No nausea, vomiting, diarrhea or constipation. No melena.  GENITOURINARY: No dysuria, hematuria  NEUROLOGICAL: + stroke like symptoms  SKIN: No burning or lesions   ENDOCRINE: No heat or cold intolerance  MUSCULOSKELETAL: No joint pain or swelling  PSYCHIATRIC: No  anxiety, mood swings  HEME/LYMPH: No bleeding gums  ALLERGY AND IMMUNOLOGIC: No hives or eczema	    All other ROS negative    PHYSICAL EXAM:  T(C): 36.4 (07-15-20 @ 20:42), Max: 37 (07-15-20 @ 08:19)  HR: 61 (07-15-20 @ 20:42) (57 - 76)  BP: 176/87 (07-15-20 @ 20:42) (147/70 - 188/69)  RR: 17 (07-15-20 @ 20:42) (16 - 25)  SpO2: 100% (07-15-20 @ 20:42) (99% - 100%)  Wt(kg): --  I&O's Summary      Appearance: Normal	  HEENT:   Normal oral mucosa, EOMI	  Cardiovascular:  S1 S2, No JVD,    Respiratory: Lungs clear to auscultation	  Psychiatry: Alert  Gastrointestinal:  Soft, Non-tender, + BS	  Skin: No rashes   Neurologic: Non-focal  Extremities:  No edema  Vascular: Peripheral pulses palpable    	    	  	  CARDIAC MARKERS:  Labs personally reviewed by me                                  12.2   4.23  )-----------( 173      ( 15 Jul 2020 06:30 )             36.3     07-15    142  |  103  |  12  ----------------------------<  118<H>  3.5   |  28  |  0.84    Ca    9.8      15 Jul 2020 20:38  Phos  2.2     07-15  Mg     2.0     07-15    TPro  7.2  /  Alb  4.1  /  TBili  0.5  /  DBili  x   /  AST  16  /  ALT  9   /  AlkPhos  71  07-14          EKG: Personally reviewed by me -   Radiology: Personally reviewed by me -       Assessment and Plan:   Assessment:  · Assessment		  This is a 68yo Female who ambulates without assistance w/ PMHx of CVA (2011- residual right sided hemiparesis, right sided facial droop and dysarthria) who presented with slurred speech and aphasia. Admitted for CVA work-up.     Problem/Plan - 1:  ·  Problem: Cerebrovascular accident (CVA), unspecified mechanism.  Plan: - Neuro c/s appreciated   - Plavix load 300mg stat now, then 75qd   - MRI brain w/o contrast ordered   - TTE w/ bubble study   - permissive HTN   - ASA 81  - Lipitor 80mg  - will need ILR prior to discharge, Dr Austin consulted    Problem/Plan - 2:  ·  Problem: Essential hypertension.  Plan: - BP meds on hold for permissive HTN, add back gradually after 24-48hrs hrs.   - monitor BP   - DASH/TLC diet.     Problem/Plan - 3:  ·  Problem: Hyperlipidemia, unspecified hyperlipidemia type.  Plan: - Continue statin, titrate down pending lipid profile   - lipid profile   - Low fat/ low cholesterol diet.     Problem/Plan - 4:  ·  Problem: Need for prophylactic measure.  Plan: DVT ppx: Sub q heparin.         Advanced care planning/advanced directives discussed with patient/family. DNR status including forceful chest compressions to attempt to restart the heart, ventilator support/artificial breathing, electric shock, artificial nutrition, health care proxy, Molst form all discussed with pt. Pt wishes to consider. Thirty minutes spent on discussing advanced directives. Differential diagnosis and plan of care discussed with patient after the evaluation. Counseling on diet, nutritional counseling, weight management, exercise and medication compliance was done.    Clarence Arroyo DO [facility]  Cardiovascular Medicine  [address] [place] [address], [address]  Office [phone]  Cell [phone]

## 2020-07-15 NOTE — OCCUPATIONAL THERAPY INITIAL EVALUATION ADULT - DIAGNOSIS, OT EVAL
s/p slurred speech, s/p r/o CVA; decreased functional mobility, decreased ADL performance, decreased functional use of right UE (at baseline)

## 2020-07-15 NOTE — SPEECH LANGUAGE PATHOLOGY EVALUATION - SLP DIAGNOSIS
Patient presents with functional receptive and expressive language abilities characterized by adequate ability to follow simple directions, adequate ability to respond to simple yes/no questions, fluent speech output, adequate responsive naming, and adequate confrontational naming. Patient presents with Mild Dysarthria marked by slow movement of articulators (upper lip) mildly affecting overall speech intelligibility at the simple conversational level.

## 2020-07-15 NOTE — PROGRESS NOTE ADULT - PROBLEM SELECTOR PLAN 2
- Patient with large left neck mass, says she has had it for years and never had it worked-up.  - Mass is non-tender, hard  - call ENT please

## 2020-07-15 NOTE — H&P ADULT - NSHPLABSRESULTS_GEN_ALL_CORE
12.3   4.42  )-----------( 173      ( 14 Jul 2020 21:21 )             37.0   07-14    143  |  102  |  19  ----------------------------<  124<H>  3.8   |  30  |  1.10    Ca    10.0      14 Jul 2020 21:21    TPro  7.2  /  Alb  4.1  /  TBili  0.5  /  DBili  x   /  AST  16  /  ALT  9   /  AlkPhos  71  07-14    Trops: 18-->17    Urinalysis (07.14.20 @ 22:10)    Color: LIGHT YELLOW    Urine Appearance: CLEAR    Glucose: NEGATIVE    Bilirubin: NEGATIVE    Ketone - Urine: NEGATIVE    Specific Gravity: 1.016    Blood: SMALL    pH - Urine: 7.5    Protein, Urine: NEGATIVE    Urobilinogen: NORMAL    Nitrite: NEGATIVE    Leukocyte Esterase Concentration: NEGATIVE    Red Blood Cell - Urine: 3-5    White Blood Cell - Urine: 0-2    Hyaline Casts: NEGATIVE    Bacteria: NEGATIVE    Squamous Epithelial: OCC      < from: CT Angio Head and Neck w/ IV Cont (07.14.20 @ 21:38) >      IMPRESSION:     CT angiography neck: No evidence of hemodynamically significant stenosis by NASCET criteria. No evidence of vascular dissection. 4.4 x 3.3 cm indeterminate low-density lesion with peripheral hyperdensity andareas of internal hyperdensity within the left parotid gland. This lesion was visualized on brain MRI dated 8/18/2011, and has increased in size by approximately 1 cm, and demonstrates mild nodularity in thickness of the wall, as well as internal hyperdensity which may represent nodules or proteinaceous debris.     CT angiography brain: No major vessel occlusion or proximal stenosis by NASCET criteria. No evidence of aneurysm or other vascular malformation. Redeminstration of hypodensity within the region of the left lentiform and corona radiata nucleus, area of previous acute infarct as seen on MR 8/18/2011.    < end of copied text >    < from: CT Brain Stroke Protocol (07.14.20 @ 21:38) >    IMPRESSION:  No acute intracranial hemorrhage.    Chronic infarct in the left putamen/corona radiata, new when compared with prior CT 8/17/2011.    Partially evaluated cystic lesion just inferior to the left ear. Follow-up CTA neck report for more detailed evaluation.    These findings were discussed with Dr. Vora at 7/14/2020 9:45 PM by  with read back confirmation.    < end of copied text >    < from: Xray Chest 1 View- PORTABLE-Urgent (07.14.20 @ 22:39) >    ******PRELIMINARY REPORT******    ******PRELIMINARY REPORT******            INTERPRETATION:  no emergent findings  < end of copied text >

## 2020-07-15 NOTE — OCCUPATIONAL THERAPY INITIAL EVALUATION ADULT - RANGE OF MOTION EXAMINATION, UPPER EXTREMITY
Left UE Active ROM was WFL (within functional limits)/Right UE with flexion contractures at wrist/elbow. Right shoulder passive ROM grossly 0-20 degrees. Right hand digit flexion limited to 1/2 composite fist. + right wrist brace to promote neutral alignment.

## 2020-07-15 NOTE — PROGRESS NOTE ADULT - SUBJECTIVE AND OBJECTIVE BOX
Subjective: Patient seen and examined. No new events except as noted.   feeling better     REVIEW OF SYSTEMS:    CONSTITUTIONAL: No weakness, fevers or chills  EYES/ENT: No visual changes;  No vertigo or throat pain   NECK: No pain or stiffness  RESPIRATORY: No cough, wheezing, hemoptysis; No shortness of breath  CARDIOVASCULAR: No chest pain or palpitations  GASTROINTESTINAL: No abdominal or epigastric pain. No nausea, vomiting, or hematemesis; No diarrhea or constipation. No melena or hematochezia.  GENITOURINARY: No dysuria, frequency or hematuria  NEUROLOGICAL: No numbness or weakness  SKIN: No itching, burning, rashes, or lesions   All other review of systems is negative unless indicated above.    MEDICATIONS:  MEDICATIONS  (STANDING):  amLODIPine   Tablet 10 milliGRAM(s) Oral at bedtime  aspirin enteric coated 81 milliGRAM(s) Oral daily  atorvastatin 80 milliGRAM(s) Oral at bedtime  heparin   Injectable 5000 Unit(s) SubCutaneous every 12 hours  labetalol 200 milliGRAM(s) Oral daily  sodium chloride 0.9% lock flush 3 milliLiter(s) IV Push every 8 hours      PHYSICAL EXAM:  T(C): 36.4 (07-15-20 @ 20:42), Max: 37 (07-15-20 @ 08:19)  HR: 61 (07-15-20 @ 20:42) (57 - 76)  BP: 176/87 (07-15-20 @ 20:42) (147/70 - 195/81)  RR: 17 (07-15-20 @ 20:42) (16 - 25)  SpO2: 100% (07-15-20 @ 20:42) (99% - 100%)  Wt(kg): --  I&O's Summary    Height (cm): 172.7 (07-15 @ 17:23)  Weight (kg): 70.1 (07-15 @ 17:23)  BMI (kg/m2): 23.5 (07-15 @ :23)  BSA (m2): 1.83 (07-15 @ :23)    Appearance: Normal	  HEENT:  PERRLA   Lymphatic: No lymphadenopathy   Cardiovascular: Normal S1 S2, no JVD  Respiratory: normal effort , clear  Gastrointestinal:  Soft, Non-tender  Skin: No rashes,  warm to touch  Psychiatry:  Mood & affect appropriate  Musculuskeletal: No edema      All labs, Imaging and EKGs personally reviewed                           12.2   4.23  )-----------( 173      ( 15 Jul 2020 06:30 )             36.3               07-15    142  |  103  |  12  ----------------------------<  118<H>  3.5   |  28  |  x     Ca    9.8      15 Jul 2020 20:38  Phos  2.2     07-15  Mg     2.0     07-15    TPro  7.2  /  Alb  4.1  /  TBili  0.5  /  DBili  x   /  AST  16  /  ALT  9   /  AlkPhos  71  07-14    PT/INR - ( 2020 21:21 )   PT: 12.9 SEC;   INR: 1.13          PTT - ( 2020 21:21 )  PTT:34.3 SEC                   Urinalysis Basic - ( 2020 22:10 )    Color: LIGHT YELLOW / Appearance: CLEAR / S.016 / pH: 7.5  Gluc: NEGATIVE / Ketone: NEGATIVE  / Bili: NEGATIVE / Urobili: NORMAL   Blood: SMALL / Protein: NEGATIVE / Nitrite: NEGATIVE   Leuk Esterase: NEGATIVE / RBC: 3-5 / WBC 0-2   Sq Epi: OCC / Non Sq Epi: x / Bacteria: NEGATIVE

## 2020-07-15 NOTE — H&P ADULT - HISTORY OF PRESENT ILLNESS
This is a 70yo Female who ambulates without assistance w/ PMHx of CVA (2011- residual right sided hemiparesis, right sided facial droop and dysarthria) who presented with worsening slurred speech since 5pm yesterday evening. Pt's daughter noticed the slurred speech, the patient only remembers her speech sounding different and feeling "warm." Pt's BP was elevated at the time SBP 200s via EMS. Code Stroke was called, once patient arrived, her speech quickly returned back to baseline, CTH was unremarkable for new findings, and patient was planned for DC home w/ outpatient neuro follow-up. While waiting for DC, the patient then had a witnessed 10 minute episode of aphasia, with drool coming out of left side of mouth. No convulsions, shaking or incontinence noted. No worsening of her right sided facial droop or right sided hemiparesis. After 10 minutes, pt returned back to her baseline. Patient partially remembers the episode says she remembers not being able to talk, denies any precipitating factors such as visual changes, dizziness, numbness, weakness, HA, chest pain or palpitations. Neuro recommended admission w/ MR brain and possible EEG. Patient currently denying seizure history, fever, chills, SOB, cough, abdominal pain, N/V/D/C, dysuria, hematuria, melena or hematochezia.

## 2020-07-15 NOTE — H&P ADULT - PROBLEM SELECTOR PLAN 3
- BP meds on hold for permissive HTN, add back gradually after 24-48hrs hrs.   - monitor BP   - DASH/TLC diet

## 2020-07-15 NOTE — PHYSICAL THERAPY INITIAL EVALUATION ADULT - PERTINENT HX OF CURRENT PROBLEM, REHAB EVAL
Pt is a 69 year old female presenting with worsening slurred speech. Pt speech quickly returned back to baseline, CTH was unremarkable for new findings, and patient was planned for DC home. While waiting for DC, the patient then had a witnessed 10 minute episode of aphasia, with drool coming out of left side of mouth. After 10 minutes, pt returned back to her baseline. Admitted for CVA work-up. PMH: CVA

## 2020-07-16 DIAGNOSIS — K11.8 OTHER DISEASES OF SALIVARY GLANDS: ICD-10-CM

## 2020-07-16 PROBLEM — I10 ESSENTIAL (PRIMARY) HYPERTENSION: Chronic | Status: ACTIVE | Noted: 2018-09-22

## 2020-07-16 PROBLEM — I63.9 CEREBRAL INFARCTION, UNSPECIFIED: Chronic | Status: ACTIVE | Noted: 2018-09-22

## 2020-07-16 PROBLEM — E78.5 HYPERLIPIDEMIA, UNSPECIFIED: Chronic | Status: ACTIVE | Noted: 2018-09-22

## 2020-07-16 LAB
ANION GAP SERPL CALC-SCNC: 12 MMO/L — SIGNIFICANT CHANGE UP (ref 7–14)
BUN SERPL-MCNC: 10 MG/DL — SIGNIFICANT CHANGE UP (ref 7–23)
CALCIUM SERPL-MCNC: 9.6 MG/DL — SIGNIFICANT CHANGE UP (ref 8.4–10.5)
CHLORIDE SERPL-SCNC: 102 MMOL/L — SIGNIFICANT CHANGE UP (ref 98–107)
CO2 SERPL-SCNC: 27 MMOL/L — SIGNIFICANT CHANGE UP (ref 22–31)
CREAT SERPL-MCNC: 0.75 MG/DL — SIGNIFICANT CHANGE UP (ref 0.5–1.3)
CULTURE RESULTS: SIGNIFICANT CHANGE UP
GLUCOSE SERPL-MCNC: 97 MG/DL — SIGNIFICANT CHANGE UP (ref 70–99)
HCT VFR BLD CALC: 37.8 % — SIGNIFICANT CHANGE UP (ref 34.5–45)
HCV AB S/CO SERPL IA: 0.19 S/CO — SIGNIFICANT CHANGE UP (ref 0–0.99)
HCV AB SERPL-IMP: SIGNIFICANT CHANGE UP
HGB BLD-MCNC: 13 G/DL — SIGNIFICANT CHANGE UP (ref 11.5–15.5)
MAGNESIUM SERPL-MCNC: 1.9 MG/DL — SIGNIFICANT CHANGE UP (ref 1.6–2.6)
MCHC RBC-ENTMCNC: 31.6 PG — SIGNIFICANT CHANGE UP (ref 27–34)
MCHC RBC-ENTMCNC: 34.4 % — SIGNIFICANT CHANGE UP (ref 32–36)
MCV RBC AUTO: 92 FL — SIGNIFICANT CHANGE UP (ref 80–100)
NRBC # FLD: 0 K/UL — SIGNIFICANT CHANGE UP (ref 0–0)
PHOSPHATE SERPL-MCNC: 3.1 MG/DL — SIGNIFICANT CHANGE UP (ref 2.5–4.5)
PLATELET # BLD AUTO: 185 K/UL — SIGNIFICANT CHANGE UP (ref 150–400)
PMV BLD: 11 FL — SIGNIFICANT CHANGE UP (ref 7–13)
POTASSIUM SERPL-MCNC: 2.9 MMOL/L — CRITICAL LOW (ref 3.5–5.3)
POTASSIUM SERPL-SCNC: 2.9 MMOL/L — CRITICAL LOW (ref 3.5–5.3)
RBC # BLD: 4.11 M/UL — SIGNIFICANT CHANGE UP (ref 3.8–5.2)
RBC # FLD: 12.7 % — SIGNIFICANT CHANGE UP (ref 10.3–14.5)
SODIUM SERPL-SCNC: 141 MMOL/L — SIGNIFICANT CHANGE UP (ref 135–145)
SPECIMEN SOURCE: SIGNIFICANT CHANGE UP
WBC # BLD: 4.02 K/UL — SIGNIFICANT CHANGE UP (ref 3.8–10.5)
WBC # FLD AUTO: 4.02 K/UL — SIGNIFICANT CHANGE UP (ref 3.8–10.5)

## 2020-07-16 PROCEDURE — 93306 TTE W/DOPPLER COMPLETE: CPT | Mod: 26

## 2020-07-16 PROCEDURE — 99233 SBSQ HOSP IP/OBS HIGH 50: CPT

## 2020-07-16 PROCEDURE — 99221 1ST HOSP IP/OBS SF/LOW 40: CPT

## 2020-07-16 RX ORDER — POTASSIUM CHLORIDE 20 MEQ
40 PACKET (EA) ORAL ONCE
Refills: 0 | Status: COMPLETED | OUTPATIENT
Start: 2020-07-16 | End: 2020-07-16

## 2020-07-16 RX ORDER — LABETALOL HCL 100 MG
200 TABLET ORAL DAILY
Refills: 0 | Status: DISCONTINUED | OUTPATIENT
Start: 2020-07-16 | End: 2020-07-17

## 2020-07-16 RX ORDER — LABETALOL HCL 100 MG
200 TABLET ORAL DAILY
Refills: 0 | Status: DISCONTINUED | OUTPATIENT
Start: 2020-07-16 | End: 2020-07-16

## 2020-07-16 RX ORDER — POTASSIUM CHLORIDE 20 MEQ
10 PACKET (EA) ORAL
Refills: 0 | Status: COMPLETED | OUTPATIENT
Start: 2020-07-16 | End: 2020-07-16

## 2020-07-16 RX ADMIN — HEPARIN SODIUM 5000 UNIT(S): 5000 INJECTION INTRAVENOUS; SUBCUTANEOUS at 21:00

## 2020-07-16 RX ADMIN — SODIUM CHLORIDE 3 MILLILITER(S): 9 INJECTION INTRAMUSCULAR; INTRAVENOUS; SUBCUTANEOUS at 21:02

## 2020-07-16 RX ADMIN — HEPARIN SODIUM 5000 UNIT(S): 5000 INJECTION INTRAVENOUS; SUBCUTANEOUS at 05:12

## 2020-07-16 RX ADMIN — SODIUM CHLORIDE 3 MILLILITER(S): 9 INJECTION INTRAMUSCULAR; INTRAVENOUS; SUBCUTANEOUS at 05:02

## 2020-07-16 RX ADMIN — Medication 100 MILLIEQUIVALENT(S): at 08:45

## 2020-07-16 RX ADMIN — SODIUM CHLORIDE 3 MILLILITER(S): 9 INJECTION INTRAMUSCULAR; INTRAVENOUS; SUBCUTANEOUS at 11:29

## 2020-07-16 RX ADMIN — Medication 81 MILLIGRAM(S): at 11:38

## 2020-07-16 RX ADMIN — AMLODIPINE BESYLATE 10 MILLIGRAM(S): 2.5 TABLET ORAL at 21:00

## 2020-07-16 RX ADMIN — Medication 100 MILLIEQUIVALENT(S): at 13:09

## 2020-07-16 RX ADMIN — Medication 200 MILLIGRAM(S): at 09:07

## 2020-07-16 RX ADMIN — Medication 40 MILLIEQUIVALENT(S): at 08:45

## 2020-07-16 RX ADMIN — Medication 100 MILLIEQUIVALENT(S): at 10:50

## 2020-07-16 RX ADMIN — ATORVASTATIN CALCIUM 80 MILLIGRAM(S): 80 TABLET, FILM COATED ORAL at 21:00

## 2020-07-16 NOTE — PROGRESS NOTE ADULT - SUBJECTIVE AND OBJECTIVE BOX
Subjective: Patient seen and examined. No new events except as noted.   feeling better today  R side weakness chronic     REVIEW OF SYSTEMS:    CONSTITUTIONAL: No weakness, fevers or chills  EYES/ENT: No visual changes;  No vertigo or throat pain   NECK: No pain or stiffness  RESPIRATORY: No cough, wheezing, hemoptysis; No shortness of breath  CARDIOVASCULAR: No chest pain or palpitations  GASTROINTESTINAL: No abdominal or epigastric pain. No nausea, vomiting, or hematemesis; No diarrhea or constipation. No melena or hematochezia.  GENITOURINARY: No dysuria, frequency or hematuria  NEUROLOGICAL: No numbness or weakness  SKIN: No itching, burning, rashes, or lesions   All other review of systems is negative unless indicated above.    MEDICATIONS:  MEDICATIONS  (STANDING):  amLODIPine   Tablet 10 milliGRAM(s) Oral at bedtime  aspirin enteric coated 81 milliGRAM(s) Oral daily  atorvastatin 80 milliGRAM(s) Oral at bedtime  clopidogrel Tablet 75 milliGRAM(s) Oral daily  heparin   Injectable 5000 Unit(s) SubCutaneous every 12 hours  labetalol 200 milliGRAM(s) Oral daily  sodium chloride 0.9% lock flush 3 milliLiter(s) IV Push every 8 hours      PHYSICAL EXAM:  T(C): 36.7 (20 @ 12:30), Max: 36.7 (07-15-20 @ 17:23)  HR: 60 (20 @ 12:30) (57 - 79)  BP: 166/84 (20 @ 12:30) (151/67 - 188/69)  RR: 16 (20 @ 12:30) (16 - 17)  SpO2: 100% (20 @ 12:30) (98% - 100%)  Wt(kg): --  I&O's Summary    Height (cm): 172.7 (07-15 @ :23)  Weight (kg): 70.1 (07-15 @ :23)  BMI (kg/m2): 23.5 (07-15 @ :23)  BSA (m2): 1.83 (07-15 @ :23)    Appearance: Normal	  HEENT:  PERRLA , L neck palpable mass   Lymphatic: No lymphadenopathy   Cardiovascular: Normal S1 S2, no JVD  Respiratory: normal effort , clear  Gastrointestinal:  Soft, Non-tender  Skin: No rashes,  warm to touch  Psychiatry:  Mood & affect appropriate  Musculuskeletal: No edema, R side weakness       All labs, Imaging and EKGs personally reviewed                           13.0   4.02  )-----------( 185      ( 2020 06:10 )             37.8               07-16    141  |  102  |  10  ----------------------------<  97  2.9<LL>   |  27  |  0.75    Ca    9.6      2020 06:10  Phos  3.1     07-16  Mg     1.9     07-16    TPro  7.2  /  Alb  4.1  /  TBili  0.5  /  DBili  x   /  AST  16  /  ALT  9   /  AlkPhos  71  07-14    PT/INR - ( 2020 21:21 )   PT: 12.9 SEC;   INR: 1.13          PTT - ( 2020 21:21 )  PTT:34.3 SEC                   Urinalysis Basic - ( 2020 22:10 )    Color: LIGHT YELLOW / Appearance: CLEAR / S.016 / pH: 7.5  Gluc: NEGATIVE / Ketone: NEGATIVE  / Bili: NEGATIVE / Urobili: NORMAL   Blood: SMALL / Protein: NEGATIVE / Nitrite: NEGATIVE   Leuk Esterase: NEGATIVE / RBC: 3-5 / WBC 0-2   Sq Epi: OCC / Non Sq Epi: x / Bacteria: NEGATIVE    < from: MR Head No Cont (07.15.20 @ 15:16) >  IMPRESSION:    1. Multiple small evolving acute/subacute infarcts within the right inferior division MCA territory with associated cytotoxic edema and without hemorrhagic transformation.    2. Multiple additional chronic infarcts and similar-appearing chronic white matter microvascular type changes, as discussed.    3. Redemonstration of a left-sided parotid mass which appears unchanged in size when compared with the recent CT examination. Malignancy is a definite consideration. A benign etiology is not excluded. It appears larger in size when compared with 2011, where it was also reported.

## 2020-07-16 NOTE — PROVIDER CONTACT NOTE (OTHER) - REASON
Patient blood pressure 176/106
Patient blood pressure 176/87
Patient blood pressure 177/61
Patient with transient episodes of mild aphasia

## 2020-07-16 NOTE — PROVIDER CONTACT NOTE (OTHER) - SITUATION
Patient blood pressure 177/61
Patient's /106.
Patient's /87
Patient with transient episodes of mild aphasia, patient without aphasia during first shift stroke assessment.

## 2020-07-16 NOTE — PROGRESS NOTE ADULT - PROBLEM SELECTOR PLAN 2
- Patient with large left neck mass, says she has had it for years and never had it worked-up.  - Mass is non-tender, hard  - ENT eval appreciated

## 2020-07-16 NOTE — PROGRESS NOTE ADULT - ATTENDING COMMENTS
agree with above; ROS otherwise negative
discussed with patient and her PMD    Differential diagnosis and plan of care discussed with patient after the evaluation.   Advanced care planning was discussed with patient for total of thirty mins.  Advanced care planning forms were reviewed and discussed.  Pain assessed and judicious use of narcotics when appropriate was discussed.  Importance of Fall prevention discussed.  Counseling on Smoking and Alcohol cessation was offered when appropriate.  Counseling on Diet, exercise, and medication compliance was done.

## 2020-07-16 NOTE — PROVIDER CONTACT NOTE (OTHER) - ASSESSMENT
Patient is alert and oriented X3, disoriented to time at this time, with mild aphasia. /67, HR 79, RR 17, 98% on room air, 97.9F. Patient denies any visual changes, no headache or vomiting.
Patient is alert and oriented X4. Denies chest pain and shortness of breath. No signs/symptoms of new neurological deficits. Patient denies headache, nausea, and vomiting. /106, HR 59, RR 17, 100% on room air.
Patient is alert and oriented X4. Denies chest pain and shortness of breath. No signs/symptoms of new neurological deficits. Patient denies headache, nausea, and vomiting. /87, HR 61, RR 17, 100% on room air.
Patient resting in bed. Asymptomatic.

## 2020-07-16 NOTE — PROVIDER CONTACT NOTE (OTHER) - BACKGROUND
Admitted R/O stroke
Admitted R/O stroke. Patient with a history CVA, HTN, HLD.
Admitted R/O stroke. Patient with a history CVA, HTN, HLD.
Patient presented with slurred speech and aphasia, admitted for CVA work up. Patient with a history previous CVA in 2011, HTN, HLD.

## 2020-07-16 NOTE — PROVIDER CONTACT NOTE (OTHER) - RECOMMENDATIONS
Will continue to monitor patient.
Will continue to monitor patient as patient is currently permissive hypertension.
Will continue to monitor patient as patient is currently permissive hypertension.

## 2020-07-16 NOTE — CONSULT NOTE ADULT - SUBJECTIVE AND OBJECTIVE BOX
EP Attending    HISTORY OF PRESENT ILLNESS:  Ms Lopez is a 70yo woman who presented with strokelike symptoms - worsening of slurred speech, drooling, nonresponsive.  Limited recollection of the event. Sent to ED via EMS by family.  On arrival, BP was over 200mmHg systolic.  This is her second stroke. The first was in 2011 resulting in right sided paralysis, facial droop and difficulty speaking.  She did not have any chest pain, palpitations, orthopnea, PND, LE edema, lightheadedness or pre-syncope recently.  She is on aspirin and plavix now, but has never taken an anticoagulant.  No known history of AFib, and no AF on telemetry overnight.  A 10 pt ROS is otherwise negative.    PAST MEDICAL & SURGICAL HISTORY:  HLD (hyperlipidemia)  HTN (hypertension)  CVA (cerebral vascular accident)  CVA, old, hemiparesis: right sided  Benign Essential Hypertension  No significant past surgical history      MEDICATIONS  (STANDING):  amLODIPine   Tablet 10 milliGRAM(s) Oral at bedtime  aspirin enteric coated 81 milliGRAM(s) Oral daily  atorvastatin 80 milliGRAM(s) Oral at bedtime  clopidogrel Tablet 75 milliGRAM(s) Oral daily  heparin   Injectable 5000 Unit(s) SubCutaneous every 12 hours  labetalol 200 milliGRAM(s) Oral daily  potassium chloride  10 mEq/100 mL IVPB 10 milliEquivalent(s) IV Intermittent every 1 hour  sodium chloride 0.9% lock flush 3 milliLiter(s) IV Push every 8 hours      Allergies    No Known Allergies    Intolerances    FAMILY HISTORY:  FH: HTN (hypertension)    Non-contributary for premature coronary disease or sudden cardiac death    SOCIAL HISTORY:    [x ] Non-smoker  [ ] Smoker  [ ] Alcohol      PHYSICAL EXAM:  T(C): 36.6 (07-16-20 @ 08:43), Max: 36.7 (07-15-20 @ 17:23)  HR: 57 (07-16-20 @ 08:43) (57 - 79)  BP: 167/87 (07-16-20 @ 08:43) (151/67 - 188/69)  RR: 16 (07-16-20 @ 08:43) (16 - 18)  SpO2: 100% (07-16-20 @ 08:43) (98% - 100%)  Wt(kg): --    Appearance: Normal	  HEENT:   Normal oral mucosa, PERRL, EOMI	  Lymphatic: No lymphadenopathy , no edema  Cardiovascular: Normal S1 S2, No JVD, No murmurs , Peripheral pulses palpable 2+ bilaterally  Respiratory: Lungs clear to auscultation, normal effort 	  Gastrointestinal:  Soft, Non-tender, + BS	  Skin: No rashes, No ecchymoses, No cyanosis, warm to touch  Musculoskeletal: right face, right arm with no movement. Right arm contracted.  Psychiatry:  Mood & affect appropriate    TELEMETRY: 	  NSR  ECG:  	NSR    Echo:  none recent  	  LABS:	 	                          13.0   4.02  )-----------( 185      ( 16 Jul 2020 06:10 )             37.8     07-16    141  |  102  |  10  ----------------------------<  97  2.9<LL>   |  27  |  0.75    Ca    9.6      16 Jul 2020 06:10  Phos  3.1     07-16  Mg     1.9     07-16    TPro  7.2  /  Alb  4.1  /  TBili  0.5  /  DBili  x   /  AST  16  /  ALT  9   /  AlkPhos  71  07-14    ASSESSMENT/PLAN: 	70yo female with recurrent stroke-like symptoms.    Agree with plan for ILR for AFib surveillance given cryptogenic stroke.  Her [id number] would be at least 5, and an appropriately prescribed anticoagulant could reduce further risk of stroke if AF is found.    Recommend TTE/RNEETTA to look for other causes of cardioembolism such as PFO, LV thrombus.    Will implant a St Tom ConfirmRX ILR today, and patient to follow up with Dr Arroyo.      Jarret Watts M.D.  Cardiac Electrophysiology    office [phone]  pager [phone] EP Attending    HISTORY OF PRESENT ILLNESS:  Ms Lopez is a 70yo woman who presented with strokelike symptoms - worsening of slurred speech, drooling, nonresponsive.  Limited recollection of the event. Sent to ED via EMS by family.  On arrival, BP was over 200mmHg systolic.  This is her second stroke. The first was in 2011 resulting in right sided paralysis, facial droop and difficulty speaking.  She did not have any chest pain, palpitations, orthopnea, PND, LE edema, lightheadedness or pre-syncope recently.  She is on aspirin and plavix now, but has never taken an anticoagulant.  No known history of AFib, and no AF on telemetry overnight.  A 10 pt ROS is otherwise negative.    PAST MEDICAL & SURGICAL HISTORY:  HLD (hyperlipidemia)  HTN (hypertension)  CVA (cerebral vascular accident)  CVA, old, hemiparesis: right sided  Benign Essential Hypertension  No significant past surgical history      MEDICATIONS  (STANDING):  amLODIPine   Tablet 10 milliGRAM(s) Oral at bedtime  aspirin enteric coated 81 milliGRAM(s) Oral daily  atorvastatin 80 milliGRAM(s) Oral at bedtime  clopidogrel Tablet 75 milliGRAM(s) Oral daily  heparin   Injectable 5000 Unit(s) SubCutaneous every 12 hours  labetalol 200 milliGRAM(s) Oral daily  potassium chloride  10 mEq/100 mL IVPB 10 milliEquivalent(s) IV Intermittent every 1 hour  sodium chloride 0.9% lock flush 3 milliLiter(s) IV Push every 8 hours      Allergies    No Known Allergies    Intolerances    FAMILY HISTORY:  FH: HTN (hypertension)    Non-contributary for premature coronary disease or sudden cardiac death    SOCIAL HISTORY:    [x ] Non-smoker  [ ] Smoker  [ ] Alcohol      PHYSICAL EXAM:  T(C): 36.6 (07-16-20 @ 08:43), Max: 36.7 (07-15-20 @ 17:23)  HR: 57 (07-16-20 @ 08:43) (57 - 79)  BP: 167/87 (07-16-20 @ 08:43) (151/67 - 188/69)  RR: 16 (07-16-20 @ 08:43) (16 - 18)  SpO2: 100% (07-16-20 @ 08:43) (98% - 100%)  Wt(kg): --    Appearance: Normal	  HEENT:   Normal oral mucosa, PERRL, EOMI	  Lymphatic: No lymphadenopathy , no edema  Cardiovascular: Normal S1 S2, No JVD, No murmurs , Peripheral pulses palpable 2+ bilaterally  Respiratory: Lungs clear to auscultation, normal effort 	  Gastrointestinal:  Soft, Non-tender, + BS	  Skin: No rashes, No ecchymoses, No cyanosis, warm to touch  Musculoskeletal: right face, right arm with no movement. Right arm contracted.  Psychiatry:  Mood & affect appropriate    TELEMETRY: 	  NSR  ECG:  	NSR    Echo:  none recent  	  LABS:	 	                          13.0   4.02  )-----------( 185      ( 16 Jul 2020 06:10 )             37.8     07-16    141  |  102  |  10  ----------------------------<  97  2.9<LL>   |  27  |  0.75    Ca    9.6      16 Jul 2020 06:10  Phos  3.1     07-16  Mg     1.9     07-16    TPro  7.2  /  Alb  4.1  /  TBili  0.5  /  DBili  x   /  AST  16  /  ALT  9   /  AlkPhos  71  07-14    ASSESSMENT/PLAN: 	70yo female with recurrent stroke-like symptoms.    Agree with plan for ILR for AFib surveillance given cryptogenic stroke.  Her [id number] would be at least 5, and an appropriately prescribed anticoagulant could reduce further risk of stroke if AF is found.    Recommend TTE/RENETTA to look for other causes of cardioembolism such as PFO, LV thrombus.    Ms Lopez strongly wishes to speak with her family and primary care physician before proceeding.  We will delay this case tentatively for tomorrow. Follow up with Dr Arroyo.      Jarret Watts M.D.  Cardiac Electrophysiology    office [phone]  pager [phone]

## 2020-07-16 NOTE — PROGRESS NOTE ADULT - SUBJECTIVE AND OBJECTIVE BOX
Patient is a 69y old  Female who presents with a chief complaint of CVA (17 Jul 2020 17:16)      INTERVAL HISTORY:     TELEMETRY Personally reviewed:  	  MEDICATIONS:  amLODIPine   Tablet 10 milliGRAM(s) Oral at bedtime  labetalol 200 milliGRAM(s) Oral daily        PHYSICAL EXAM:  T(C): 36.9 (07-17-20 @ 13:00), Max: 36.9 (07-17-20 @ 05:58)  HR: 80 (07-17-20 @ 13:00) (61 - 80)  BP: 148/78 (07-17-20 @ 13:00) (137/74 - 159/72)  RR: 18 (07-17-20 @ 13:00) (17 - 18)  SpO2: 99% (07-17-20 @ 13:00) (98% - 100%)  Wt(kg): --  I&O's Summary        Appearance: In no distress	  HEENT:    PERRL, EOMI	  Cardiovascular:  S1 S2, No JVD  Respiratory: Lungs clear to auscultation	  Gastrointestinal:  Soft, Non-tender, + BS	  Vascularature:  No edema of LE  Psychiatric: Appropriate affect   Neuro: no acute focal deficits                 Labs personally reviewed      Assessment and Plan:   Assessment:  · Assessment		  This is a 70yo Female who ambulates without assistance w/ PMHx of CVA (2011- residual right sided hemiparesis, right sided facial droop and dysarthria) who presented with slurred speech and aphasia. Admitted for CVA work-up.     Problem/Plan - 1:  ·  Problem: Cerebrovascular accident (CVA), unspecified mechanism.  Plan: - Neuro c/s appreciated   - Plavix load 300mg stat now, then 75qd   - MRI brain w/o contrast ordered   - TTE w/ bubble study   - permissive HTN   - ASA 81  - Lipitor 80mg  - ILR in AM    Problem/Plan - 2:  ·  Problem: Essential hypertension.  Plan: - BP meds on hold for permissive HTN, add back gradually after 24-48hrs hrs.   - monitor BP   - DASH/TLC diet.     Problem/Plan - 3:  ·  Problem: Hyperlipidemia, unspecified hyperlipidemia type.  Plan: - Continue statin, titrate down pending lipid profile   - lipid profile   - Low fat/ low cholesterol diet.     Problem/Plan - 4:  ·  Problem: Need for prophylactic measure.  Plan: DVT ppx: Sub q heparin.         Clarence Arroyo DO [facility]  Cardiovascular Medicine  [address] [place] [address], [address]  Office: [phone]  Cell: [phone]

## 2020-07-16 NOTE — PROGRESS NOTE ADULT - SUBJECTIVE AND OBJECTIVE BOX
SUBJECTIVE: patient seen and examined at bedside.     MEDICATIONS (HOME):  Home Medications:  amLODIPine 10 mg oral tablet: 1 tab(s) orally once a day (15 Jul 2020 05:07)  aspirin 81 mg oral delayed release tablet: 1 tab(s) orally once a day (15 Jul 2020 05:07)  atorvastatin 10 mg oral tablet: 1 tab(s) orally once a day (15 Jul 2020 05:07)  labetalol 200 mg oral tablet: 1 tab(s) orally once a day (15 Jul 2020 05:07)  potassium chloride 10 mEq oral tablet, extended release: 1 tab(s) orally once a day (15 Jul 2020 05:07)    MEDICATIONS  (STANDING):  amLODIPine   Tablet 10 milliGRAM(s) Oral at bedtime  aspirin enteric coated 81 milliGRAM(s) Oral daily  atorvastatin 80 milliGRAM(s) Oral at bedtime  clopidogrel Tablet 75 milliGRAM(s) Oral daily  heparin   Injectable 5000 Unit(s) SubCutaneous every 12 hours  labetalol 200 milliGRAM(s) Oral daily  potassium chloride  10 mEq/100 mL IVPB 10 milliEquivalent(s) IV Intermittent every 1 hour  sodium chloride 0.9% lock flush 3 milliLiter(s) IV Push every 8 hours    MEDICATIONS  (PRN):    ALLERGIES/INTOLERANCES:  Allergies  No Known Allergies    VITALS & EXAMINATION:  Vital Signs Last 24 Hrs  T(C): 36.6 (2020 08:43), Max: 36.7 (15 Jul 2020 17:23)  T(F): 97.9 (2020 08:43), Max: 98.1 (15 Jul 2020 17:23)  HR: 57 (2020 08:43) (57 - 79)  BP: 167/87 (2020 08:43) (151/67 - 188/69)  BP(mean): --  RR: 16 (2020 08:43) (16 - 18)  SpO2: 100% (2020 08:43) (98% - 100%)    Neurological (>12):  MS: eyes open, knows her age, location, month, speech much improved (trace dysarthria) with intact repetition, naming, comprehension    CNs: VFF. EOMI no nystagmus, No facial asymmetry b/l, full eye closure strength b/l. Hearing grossly normal (rubbing fingers) b/l.   Fundoscopic: pale w/ sharp discs margins No vascular changes.      Motor: RUE contracted. RLE, LUE, LLE no drift.     Sensation: Intact to LT b/l throughout.     Cortical: Extinction on DSS (neglect): none    Coordination: no dysmetria to FTN upon left hand; unable to evaluate on right given contracture    Gait: deferred    LABORATORY:  CBC                       13.0   4.02  )-----------( 185      ( 2020 06:10 )             37.8     Chem 07-    141  |  102  |  10  ----------------------------<  97  2.9<LL>   |  27  |  0.75    Ca    9.6      2020 06:10  Phos  3.1     07-16  Mg     1.9     -16    TPro  7.2  /  Alb  4.1  /  TBili  0.5  /  DBili  x   /  AST  16  /  ALT  9   /  AlkPhos  71  07-14    LFTs LIVER FUNCTIONS - ( 2020 21:21 )  Alb: 4.1 g/dL / Pro: 7.2 g/dL / ALK PHOS: 71 u/L / ALT: 9 u/L / AST: 16 u/L / GGT: x           Coagulopathy PT/INR - ( 2020 21:21 )   PT: 12.9 SEC;   INR: 1.13          PTT - ( 2020 21:21 )  PTT:34.3 SEC  Lipid Panel 07-15 Chol 189  HDL 53 Trig 71  A1c   Cardiac enzymes     U/A Urinalysis Basic - ( 2020 22:10 )    Color: LIGHT YELLOW / Appearance: CLEAR / S.016 / pH: 7.5  Gluc: NEGATIVE / Ketone: NEGATIVE  / Bili: NEGATIVE / Urobili: NORMAL   Blood: SMALL / Protein: NEGATIVE / Nitrite: NEGATIVE   Leuk Esterase: NEGATIVE / RBC: 3-5 / WBC 0-2   Sq Epi: OCC / Non Sq Epi: x / Bacteria: NEGATIVE    STUDIES & IMAGING:  Studies (EKG, EEG, EMG, etc):     Radiology (XR, CT, MR, U/S, TTE/RENETTA):    < from: MR Head No Cont (07.15.20 @ 15:16) >  IMPRESSION:    1. Multiple small evolving acute/subacute infarcts within the right inferior division MCA territory with associated cytotoxic edema and without hemorrhagic transformation.    2. Multiple additional chronic infarcts and similar-appearing chronic white matter microvascular type changes, as discussed.    3. Redemonstration of a left-sided parotid mass which appears unchanged in size when compared with the recent CT examination. Malignancy is a definite consideration. A benign etiology is not excluded. It appears larger in size when compared with 2011, where it was also reported.    < end of copied text > SUBJECTIVE: patient seen and examined at bedside.     MEDICATIONS (HOME):  Home Medications:  amLODIPine 10 mg oral tablet: 1 tab(s) orally once a day (15 Jul 2020 05:07)  aspirin 81 mg oral delayed release tablet: 1 tab(s) orally once a day (15 Jul 2020 05:07)  atorvastatin 10 mg oral tablet: 1 tab(s) orally once a day (15 Jul 2020 05:07)  labetalol 200 mg oral tablet: 1 tab(s) orally once a day (15 Jul 2020 05:07)  potassium chloride 10 mEq oral tablet, extended release: 1 tab(s) orally once a day (15 Jul 2020 05:07)    MEDICATIONS  (STANDING):  amLODIPine   Tablet 10 milliGRAM(s) Oral at bedtime  aspirin enteric coated 81 milliGRAM(s) Oral daily  atorvastatin 80 milliGRAM(s) Oral at bedtime  clopidogrel Tablet 75 milliGRAM(s) Oral daily  heparin   Injectable 5000 Unit(s) SubCutaneous every 12 hours  labetalol 200 milliGRAM(s) Oral daily  potassium chloride  10 mEq/100 mL IVPB 10 milliEquivalent(s) IV Intermittent every 1 hour  sodium chloride 0.9% lock flush 3 milliLiter(s) IV Push every 8 hours    MEDICATIONS  (PRN):    ALLERGIES/INTOLERANCES:  Allergies  No Known Allergies    VITALS & EXAMINATION:  Vital Signs Last 24 Hrs  T(C): 36.6 (2020 08:43), Max: 36.7 (15 Jul 2020 17:23)  T(F): 97.9 (2020 08:43), Max: 98.1 (15 Jul 2020 17:23)  HR: 57 (2020 08:43) (57 - 79)  BP: 167/87 (2020 08:43) (151/67 - 188/69)  BP(mean): --  RR: 16 (2020 08:43) (16 - 18)  SpO2: 100% (2020 08:43) (98% - 100%)    Neurological (>12):  MS: eyes open, knows her age, location, month, speech much improved (trace dysarthria) with intact repetition, naming, comprehension    CNs: VFF. EOMI no nystagmus, No facial asymmetry b/l, full eye closure strength b/l. Hearing grossly normal (rubbing fingers) b/l.   Fundoscopic: pale w/ sharp discs margins No vascular changes.      Motor: RUE contracted, 2/5 at shoulder. RLE, LUE, LLE no drift.     Sensation: Intact to LT b/l throughout.     Cortical: Extinction on DSS (neglect): none    Coordination: no dysmetria to FTN upon left hand; unable to evaluate on right given contracture    Gait: deferred    LABORATORY:  CBC                       13.0   4.02  )-----------( 185      ( 2020 06:10 )             37.8     Chem 07-    141  |  102  |  10  ----------------------------<  97  2.9<LL>   |  27  |  0.75    Ca    9.6      2020 06:10  Phos  3.1     07-16  Mg     1.9     -16    TPro  7.2  /  Alb  4.1  /  TBili  0.5  /  DBili  x   /  AST  16  /  ALT  9   /  AlkPhos  71  07-14    LFTs LIVER FUNCTIONS - ( 2020 21:21 )  Alb: 4.1 g/dL / Pro: 7.2 g/dL / ALK PHOS: 71 u/L / ALT: 9 u/L / AST: 16 u/L / GGT: x           Coagulopathy PT/INR - ( 2020 21:21 )   PT: 12.9 SEC;   INR: 1.13          PTT - ( 2020 21:21 )  PTT:34.3 SEC  Lipid Panel -15 Chol 189  HDL 53 Trig 71  A1c   Cardiac enzymes     U/A Urinalysis Basic - ( 2020 22:10 )    Color: LIGHT YELLOW / Appearance: CLEAR / S.016 / pH: 7.5  Gluc: NEGATIVE / Ketone: NEGATIVE  / Bili: NEGATIVE / Urobili: NORMAL   Blood: SMALL / Protein: NEGATIVE / Nitrite: NEGATIVE   Leuk Esterase: NEGATIVE / RBC: 3-5 / WBC 0-2   Sq Epi: OCC / Non Sq Epi: x / Bacteria: NEGATIVE    STUDIES & IMAGING:  Studies (EKG, EEG, EMG, etc):     Radiology (XR, CT, MR, U/S, TTE/RENETTA):    < from: MR Head No Cont (07.15.20 @ 15:16) >  IMPRESSION:    1. Multiple small evolving acute/subacute infarcts within the right inferior division MCA territory with associated cytotoxic edema and without hemorrhagic transformation.    2. Multiple additional chronic infarcts and similar-appearing chronic white matter microvascular type changes, as discussed.    3. Redemonstration of a left-sided parotid mass which appears unchanged in size when compared with the recent CT examination. Malignancy is a definite consideration. A benign etiology is not excluded. It appears larger in size when compared with 2011, where it was also reported.    < end of copied text >

## 2020-07-16 NOTE — PROGRESS NOTE ADULT - PROBLEM SELECTOR PLAN 1
- Neuro c/s appreciated   - S/P Plavix load 300mg , then 75qd   - MRI brain w/o contrast noted   - TTE w/ bubble study   - vEEG x 24hrs   - Neuro checks q4hrs, stoke checks per routine   - S/P permissive HTN   - ASA 81  - Lipitor 80  - diet as tolerated   - S&S eval  - seizure, aspiration precautions  - fall precautions   - PT/OT eval  - EP fr loop, plan for tomorrow

## 2020-07-16 NOTE — PROGRESS NOTE ADULT - ASSESSMENT
69F w/ PMH and CVA in 2011 (L. posterior limb of internal capsule w/ residual R. hemiparesis and dysarthria in 2011, on ASA), HTN, HLD, presents w/ multiple transient episode of worsening of baseline dysarthria. Neurological examination nonfocal at this time. MRI brain w/o contrast demonstrates patchy involvement RMCA inferior division infarct as well as a concerning left sided parotid mass.     Impression: Multiple stereotyped transient episodes of isolated dysarthria which is non-localizing 2/2 now imaged RMCA inferior division infarct with mechanism ESUS (embolic stroke of unknown source)    Plan:   [] normotensive BP  [x] MRI brain without contrast -done   [] TTE w/ bubble - pending   [] left sided parotid mass to be evaluated per primary team/ENT  [] would benefit from ILR - currently being addressed by Cardiology per chart review  [] currently on asp & plavix - to be re-addressed based off of embolic workup   [] Atorvastatin 80mg (titrate to LDL < 70)   [] Lovenox SQ for DVT prophylaxis   [] PT/OT; S/S evaluation   [] Upon discharge, please follow up at [address] [place] [address] with Suzie Smith NP  [phone]  Please email [email or web address] to schedule an appointment with the stroke NP for 1 week after discharge    mgmt to be d/w Stroke Attending Dr. Libman; primary team; patient 69F w/ PMH and CVA in 2011 (L. posterior limb of internal capsule w/ residual R. hemiparesis and dysarthria in 2011, on ASA), HTN, HLD, presents w/ multiple transient episode of worsening of baseline dysarthria. Neurological examination nonfocal at this time. MRI brain w/o contrast demonstrates patchy involvement RMCA inferior division infarct as well as a concerning left sided parotid mass.     Impression: Multiple stereotyped transient episodes of isolated dysarthria which is non-localizing 2/2 now imaged RMCA inferior division infarct with mechanism ESUS (embolic stroke of unknown source)    Plan:   [] normotensive BP  [x] MRI brain without contrast -done   [] TTE w/ bubble - pending   [] left sided parotid mass to be evaluated per primary team/ENT  [] would benefit from ILR - currently being addressed by Cardiology per chart review  [] currently on asp & plavix - to be re-addressed based off of embolic workup   [] Atorvastatin 80mg (titrate to LDL < 70)   [] Lovenox SQ for DVT prophylaxis   [] PT/OT; S/S evaluation   [] After 1 month of long term telemetry monitoring for occult afib, if patient does not have any arrhythmia --> would then require follow up for potential CT c/a/p w/ contrast for evaluation of occult malignancy as outpatient  [] Upon discharge, please follow up at [address] [place] [address] with Suzie Smith NP  [phone]  Please email [email or web address] to schedule an appointment with the stroke NP for 1 week after discharge    mgmt to be d/w Stroke Attending Dr. Libman; primary team; patient 69F w/ PMH and CVA in 2011 (L. posterior limb of internal capsule w/ residual R. hemiparesis and dysarthria in 2011, on ASA), HTN, HLD, presents w/ multiple transient episodes of worsening of baseline dysarthria. Neurological examination nonfocal at this time. MRI brain w/o contrast demonstrates patchy involvement RMCA inferior division infarct as well as a concerning left sided parotid mass.     Impression: Multiple stereotyped transient episodes of isolated dysarthria which is non-localizing 2/2 now imaged RMCA inferior division infarct with mechanism ESUS (embolic stroke of unknown source). Hypercoagulability of occult malignancy may be considered if w/u for cardioembolism is negative.     Plan:   [] normotensive BP  [x] MRI brain without contrast -done   [] TTE w/ bubble - pending   [] left sided parotid mass to be evaluated per primary team/ENT  [] would benefit from ILR - currently being addressed by Cardiology per chart review  [] currently on asp & plavix - to be re-addressed based off of embolic workup   [] Atorvastatin 80mg (titrate to LDL < 70)   [] Lovenox SQ for DVT prophylaxis   [] PT/OT; S/S evaluation   [] After 1 month of long term telemetry monitoring for occult afib, if patient does not have any arrhythmia --> would then suggest follow up for potential CT c/a/p w/ contrast for evaluation of occult malignancy as outpatient  [] Upon discharge, please follow up at [address] [place] [address] with Suzie Smith NP  [phone]  Please email [email or web address] to schedule an appointment with the stroke NP for 1 week after discharge    mgmt to be d/w Stroke Attending Dr. Libman; primary team; patient

## 2020-07-16 NOTE — CONSULT NOTE ADULT - SUBJECTIVE AND OBJECTIVE BOX
CC: left parotid mass    HPI: 69 year old female admitted with acute CVA. Patient with chronic left parotid mass. States she first noticed it about 7 or 8 years ago. Patient had imaging done that showed increase in size of mass. Denies any SOB, Dyspnea, Dysphagia. Tolerating oral diet. States her primary MD is following mass. No other complaints      PAST MEDICAL & SURGICAL HISTORY:  HLD (hyperlipidemia)  HTN (hypertension)  CVA (cerebral vascular accident)  CVA, old, hemiparesis: right sided  Benign Essential Hypertension  No significant past surgical history    Allergies    No Known Allergies    Intolerances      MEDICATIONS  (STANDING):  amLODIPine   Tablet 10 milliGRAM(s) Oral at bedtime  aspirin enteric coated 81 milliGRAM(s) Oral daily  atorvastatin 80 milliGRAM(s) Oral at bedtime  clopidogrel Tablet 75 milliGRAM(s) Oral daily  heparin   Injectable 5000 Unit(s) SubCutaneous every 12 hours  labetalol 200 milliGRAM(s) Oral daily  sodium chloride 0.9% lock flush 3 milliLiter(s) IV Push every 8 hours    MEDICATIONS  (PRN):    ROS:   ENT: all negative except as noted in HPI   CV: denies palpitations  Pulm: denies SOB, cough, hemoptysis  GI: denies change in apetite, indigestion, n/v  : denies pertinent urinary symptoms, urgency  Neuro: denies numbness/tingling, loss of sensation  Psych: denies anxiety  MS: denies muscle weakness, instability  Heme: denies easy bruising or bleeding  Endo: denies heat/cold intolerance, excessive sweating  Vascular: denies LE edema    Vital Signs Last 24 Hrs  T(C): 36.7 (16 Jul 2020 12:30), Max: 36.7 (15 Jul 2020 17:23)  T(F): 98 (16 Jul 2020 12:30), Max: 98.1 (15 Jul 2020 17:23)  HR: 60 (16 Jul 2020 12:30) (57 - 79)  BP: 166/84 (16 Jul 2020 12:30) (151/67 - 188/69)  BP(mean): --  RR: 16 (16 Jul 2020 12:30) (16 - 17)  SpO2: 100% (16 Jul 2020 12:30) (98% - 100%)                          13.0   4.02  )-----------( 185      ( 16 Jul 2020 06:10 )             37.8    07-16    141  |  102  |  10  ----------------------------<  97  2.9<LL>   |  27  |  0.75    Ca    9.6      16 Jul 2020 06:10  Phos  3.1     07-16  Mg     1.9     07-16    TPro  7.2  /  Alb  4.1  /  TBili  0.5  /  DBili  x   /  AST  16  /  ALT  9   /  AlkPhos  71  07-14   PT/INR - ( 14 Jul 2020 21:21 )   PT: 12.9 SEC;   INR: 1.13          PTT - ( 14 Jul 2020 21:21 )  PTT:34.3 SEC    PHYSICAL EXAM:  Gen: NAD  Skin: No rashes, bruises, or lesions  Head: Normocephalic, Atraumatic  Face: no edema, erythema, or fluctuance. left Parotid gland mass  Eyes: no scleral injection  Ears: Right - ear canal clear, TM intact without effusion or erythema. No evidence of any fluid drainage. No mastoid tenderness, erythema, or ear bulging            Left - ear canal clear, TM intact without effusion or erythema. No evidence of any fluid drainage. No mastoid tenderness, erythema, or ear bulging  Nose: Nares bilaterally patent, no discharge  Mouth: No Stridor / Drooling / Trismus.  Mucosa moist, tongue/uvula midline, oropharynx clear  Neck: Flat, supple, no lymphadenopathy, trachea midline, no masses  Lymphatic: No lymphadenopathy  Resp: breathing easily, no stridor  CV: no peripheral edema/cyanosis  GI: nondistended   Peripheral vascular: no JVD or edema  Neuro: facial nerve intact, no facial droop      Diagnostic Nasal Endoscopy: (Scope #2 used)    Fiberoptic Indirect laryngoscopy:  (Scope #2 used)    IMAGING/ADDITIONAL STUDIES:     IMPRESSION:     1. Multiple small evolving acute/subacute infarcts within the right inferior   division MCA territory with associated cytotoxic edema and without   hemorrhagic transformation.     2. Multiple additional chronic infarcts and similar-appearing chronic white   matter microvascular type changes, as discussed.     3. Redemonstration of a left-sided parotid mass which appears unchanged in   size when compared with the recent CT examination. Malignancy is a definite   consideration. A benign etiology is not excluded. It appears larger in size   when compared with 8/18/2011, where it was also reported.

## 2020-07-16 NOTE — CONSULT NOTE ADULT - PROBLEM SELECTOR RECOMMENDATION 9
1. Patient has had left parotid mass for many years. Recent change in size. Would recommend out patient follow up with ENT for scheduling of out patient FNA. Patient requesting having her PCP set her up with ENT. Can also follow up with [facility]

## 2020-07-17 ENCOUNTER — TRANSCRIPTION ENCOUNTER (OUTPATIENT)
Age: 70
End: 2020-07-17

## 2020-07-17 VITALS
OXYGEN SATURATION: 99 % | RESPIRATION RATE: 18 BRPM | DIASTOLIC BLOOD PRESSURE: 78 MMHG | SYSTOLIC BLOOD PRESSURE: 148 MMHG | TEMPERATURE: 98 F | HEART RATE: 80 BPM

## 2020-07-17 LAB
ANION GAP SERPL CALC-SCNC: 11 MMO/L — SIGNIFICANT CHANGE UP (ref 7–14)
BUN SERPL-MCNC: 22 MG/DL — SIGNIFICANT CHANGE UP (ref 7–23)
CALCIUM SERPL-MCNC: 9.6 MG/DL — SIGNIFICANT CHANGE UP (ref 8.4–10.5)
CHLORIDE SERPL-SCNC: 102 MMOL/L — SIGNIFICANT CHANGE UP (ref 98–107)
CO2 SERPL-SCNC: 27 MMOL/L — SIGNIFICANT CHANGE UP (ref 22–31)
CREAT SERPL-MCNC: 1.07 MG/DL — SIGNIFICANT CHANGE UP (ref 0.5–1.3)
GLUCOSE SERPL-MCNC: 95 MG/DL — SIGNIFICANT CHANGE UP (ref 70–99)
HCT VFR BLD CALC: 36.7 % — SIGNIFICANT CHANGE UP (ref 34.5–45)
HGB BLD-MCNC: 12.3 G/DL — SIGNIFICANT CHANGE UP (ref 11.5–15.5)
MAGNESIUM SERPL-MCNC: 2.1 MG/DL — SIGNIFICANT CHANGE UP (ref 1.6–2.6)
MCHC RBC-ENTMCNC: 31.2 PG — SIGNIFICANT CHANGE UP (ref 27–34)
MCHC RBC-ENTMCNC: 33.5 % — SIGNIFICANT CHANGE UP (ref 32–36)
MCV RBC AUTO: 93.1 FL — SIGNIFICANT CHANGE UP (ref 80–100)
NRBC # FLD: 0 K/UL — SIGNIFICANT CHANGE UP (ref 0–0)
PHOSPHATE SERPL-MCNC: 3.9 MG/DL — SIGNIFICANT CHANGE UP (ref 2.5–4.5)
PLATELET # BLD AUTO: 187 K/UL — SIGNIFICANT CHANGE UP (ref 150–400)
PMV BLD: 11.2 FL — SIGNIFICANT CHANGE UP (ref 7–13)
POTASSIUM SERPL-MCNC: 3.2 MMOL/L — LOW (ref 3.5–5.3)
POTASSIUM SERPL-SCNC: 3.2 MMOL/L — LOW (ref 3.5–5.3)
RBC # BLD: 3.94 M/UL — SIGNIFICANT CHANGE UP (ref 3.8–5.2)
RBC # FLD: 12.9 % — SIGNIFICANT CHANGE UP (ref 10.3–14.5)
SODIUM SERPL-SCNC: 140 MMOL/L — SIGNIFICANT CHANGE UP (ref 135–145)
WBC # BLD: 3.96 K/UL — SIGNIFICANT CHANGE UP (ref 3.8–10.5)
WBC # FLD AUTO: 3.96 K/UL — SIGNIFICANT CHANGE UP (ref 3.8–10.5)

## 2020-07-17 RX ORDER — POTASSIUM CHLORIDE 20 MEQ
2 PACKET (EA) ORAL
Qty: 60 | Refills: 0
Start: 2020-07-17 | End: 2020-08-15

## 2020-07-17 RX ORDER — ATORVASTATIN CALCIUM 80 MG/1
1 TABLET, FILM COATED ORAL
Qty: 0 | Refills: 0 | DISCHARGE

## 2020-07-17 RX ORDER — ATORVASTATIN CALCIUM 80 MG/1
1 TABLET, FILM COATED ORAL
Qty: 0 | Refills: 0 | DISCHARGE
Start: 2020-07-17

## 2020-07-17 RX ORDER — CLOPIDOGREL BISULFATE 75 MG/1
1 TABLET, FILM COATED ORAL
Qty: 21 | Refills: 0
Start: 2020-07-17 | End: 2020-08-06

## 2020-07-17 RX ORDER — POTASSIUM CHLORIDE 20 MEQ
40 PACKET (EA) ORAL EVERY 4 HOURS
Refills: 0 | Status: COMPLETED | OUTPATIENT
Start: 2020-07-17 | End: 2020-07-17

## 2020-07-17 RX ORDER — POTASSIUM CHLORIDE 20 MEQ
1 PACKET (EA) ORAL
Qty: 0 | Refills: 0 | DISCHARGE

## 2020-07-17 RX ORDER — ATORVASTATIN CALCIUM 80 MG/1
1 TABLET, FILM COATED ORAL
Qty: 30 | Refills: 0
Start: 2020-07-17 | End: 2020-08-15

## 2020-07-17 RX ADMIN — Medication 40 MILLIEQUIVALENT(S): at 11:59

## 2020-07-17 RX ADMIN — Medication 200 MILLIGRAM(S): at 06:00

## 2020-07-17 RX ADMIN — Medication 40 MILLIEQUIVALENT(S): at 08:52

## 2020-07-17 RX ADMIN — SODIUM CHLORIDE 3 MILLILITER(S): 9 INJECTION INTRAMUSCULAR; INTRAVENOUS; SUBCUTANEOUS at 05:59

## 2020-07-17 RX ADMIN — HEPARIN SODIUM 5000 UNIT(S): 5000 INJECTION INTRAVENOUS; SUBCUTANEOUS at 06:00

## 2020-07-17 RX ADMIN — SODIUM CHLORIDE 3 MILLILITER(S): 9 INJECTION INTRAMUSCULAR; INTRAVENOUS; SUBCUTANEOUS at 14:19

## 2020-07-17 RX ADMIN — Medication 81 MILLIGRAM(S): at 11:59

## 2020-07-17 NOTE — PROGRESS NOTE ADULT - SUBJECTIVE AND OBJECTIVE BOX
EP Attending    HISTORY OF PRESENT ILLNESS:  Ms Lopez is a 68yo woman who presented with strokelike symptoms - worsening of slurred speech, drooling, nonresponsive.  Limited recollection of the event. Sent to ED via EMS by family.  On arrival, BP was over 200mmHg systolic.  This is her second stroke. The first was in 2011 resulting in right sided paralysis, facial droop and difficulty speaking.  She did not have any chest pain, palpitations, orthopnea, PND, LE edema, lightheadedness or pre-syncope recently.  She is on aspirin and plavix now, but has never taken an anticoagulant.  No known history of AFib, and no AF on telemetry overnight.        7/17: No AF on overnight telemetry. No sadaf events on telemetry.  Feels well today, no chest pain, shortness of breath or palpitations.  A 10 pt ROS is otherwise negative.    amLODIPine   Tablet 10 milliGRAM(s) Oral at bedtime  aspirin enteric coated 81 milliGRAM(s) Oral daily  atorvastatin 80 milliGRAM(s) Oral at bedtime  clopidogrel Tablet 75 milliGRAM(s) Oral daily  labetalol 200 milliGRAM(s) Oral daily  potassium chloride    Tablet ER 40 milliEquivalent(s) Oral every 4 hours  sodium chloride 0.9% lock flush 3 milliLiter(s) IV Push every 8 hours                          12.3   3.96  )-----------( 187      ( 17 Jul 2020 05:20 )             36.7       07-17    140  |  102  |  22  ----------------------------<  95  3.2<L>   |  27  |  1.07    Ca    9.6      17 Jul 2020 05:20  Phos  3.9     07-17  Mg     2.1     07-17    T(C): 36.7 (07-17-20 @ 09:10), Max: 36.9 (07-17-20 @ 05:58)  HR: 75 (07-17-20 @ 09:10) (59 - 75)  BP: 147/74 (07-17-20 @ 09:10) (137/74 - 168/80)  RR: 18 (07-17-20 @ 09:10) (16 - 18)  SpO2: 98% (07-17-20 @ 09:10) (98% - 100%)  Wt(kg): --      Appearance: Normal	  HEENT:   Normal oral mucosa, PERRL, EOMI	  Lymphatic: No lymphadenopathy , no edema  Cardiovascular: Normal S1 S2, No JVD, No murmurs , Peripheral pulses palpable 2+ bilaterally  Respiratory: Lungs clear to auscultation, normal effort 	  Gastrointestinal:  Soft, Non-tender, + BS	  Skin: No rashes, No ecchymoses, No cyanosis, warm to touch  Musculoskeletal: right face, right arm with no movement. Right arm contracted.  Psychiatry:  Mood & affect appropriate    TELEMETRY: 	  NSR  ECG:  	NSR    Echo:  none recent  	  LABS:	                           13.0   4.02  )-----------( 185      ( 16 Jul 2020 06:10 )             37.8     07-16    141  |  102  |  10  ----------------------------<  97  2.9<LL>   |  27  |  0.75    Ca    9.6      16 Jul 2020 06:10  Phos  3.1     07-16  Mg     1.9     07-16    TPro  7.2  /  Alb  4.1  /  TBili  0.5  /  DBili  x   /  AST  16  /  ALT  9   /  AlkPhos  71  07-14    ASSESSMENT/PLAN: 	68yo female with recurrent stroke-like symptoms.    ILR implant today. (BHAVIKM, followup w/ Dr Arroyo)  Recommend TTE/RENETTA to look for other causes of cardioembolism such as PFO, LV thrombus.      Jarret Watts M.D.  Cardiac Electrophysiology    office [phone]  pager [phone]

## 2020-07-17 NOTE — PROGRESS NOTE ADULT - SUBJECTIVE AND OBJECTIVE BOX
EP Brief Operative Note    Diagnosis: Stroke  Procedure: ILR Insertion (SJM ConfirmRx)  Surgeon: Jarret Watts M.D.  Findings: Uneventful ILR insertion  EBL: minimal  Specimens: none  Post-op Diagnosis: Stroke  Assistants: none      A/P)   OK to return to floor. Resume diet. Hold SQ heparin tonight. Can resume tomorrow if still in hospital.  Followup ILR results with Dr Arroyo.    aJrret Watts M.D.  Cardiac Electrophysiology    office [phone]  pager [phone]

## 2020-07-17 NOTE — DISCHARGE NOTE PROVIDER - NSDCFUADDAPPT_GEN_ALL_CORE_FT
Would recommend out patient follow up with ENT for scheduling of out patient FNA. Patient requesting having her PCP set her up with ENT. Can also follow up with [facility]  Would recommend out patient follow up with ENT for scheduling of out patient FNA. Patient requesting having her PCP set her up with ENT. Can also follow up with [facility]      Upon discharge, please follow up at [address] [place] [address] with Suzie Smith NP  [phone]  Please email [email or web address] to schedule an appointment with the stroke NP for 1 week after discharge

## 2020-07-17 NOTE — DISCHARGE NOTE NURSING/CASE MANAGEMENT/SOCIAL WORK - PATIENT PORTAL LINK FT
You can access the FollowMyHealth Patient Portal offered by [facility] by registering at the following website: [email or web address]. By joining [employer]’s FollowMyHealth portal, you will also be able to view your health information using other applications (apps) compatible with our system.

## 2020-07-17 NOTE — DISCHARGE NOTE PROVIDER - HOSPITAL COURSE
This is a 70yo Female who ambulates without assistance w/ PMHx of CVA (2011- residual right sided hemiparesis, right sided facial droop and dysarthria) who presented with slurred speech and aphasia. Admitted for CVA work-up.         ·  1) Cerebrovascular accident (CVA), unspecified mechanism.  Plan: - Neuro c/s appreciated     - S/P Plavix load 300mg , then 75qd     - MRI brain w/o contrast noted     - TTE w/ bubble study     - ASA 81    - Lipitor 80    - Seen by EP S/P Loop Implant , Pt to follow up with Dr. Arroyo in 1-2 weeks as outpt.        ·  2) Neck mass.  Plan: - Patient with large left neck mass, says she has had it for years and never had it worked-up.    - Mass is non-tender, hard    - ENT eval: Would recommend out patient follow up with ENT for scheduling of out patient FNA. Patient requesting having her PCP set her up with ENT. Can also follow up with [facility] .        · 3) Essential hypertension.  Plan: - resumed BP meds    -DASH/TLC diet.         ·   4) Hyperlipidemia, unspecified hyperlipidemia type.  Plan: - Continue statin, titrate down pending lipid profile     - lipid profile     - Low fat/ low cholesterol diet.     Case discussed with attending, pt is stable For Discharge.

## 2020-07-17 NOTE — DISCHARGE NOTE NURSING/CASE MANAGEMENT/SOCIAL WORK - NSDCPEPTSTRK_GEN_ALL_CORE
Stroke education booklet/Risk factors for stroke/Need for follow up after discharge/Signs and symptoms of stroke/Stroke support groups for patients, families, and friends/Stroke warning signs and symptoms/Prescribed medications/Call 911 for stroke

## 2020-07-17 NOTE — DISCHARGE NOTE PROVIDER - CARE PROVIDER_API CALL
NICKI VALENZUELA  Internal Medicine  [address] [place] [address]  Phone: [phone]  Fax: [phone]  Follow Up Time:     Clarence Arroyo  CARDIOVASCULAR DISEASE  [address] [place] [address]  Phone: [phone]  Fax: [phone]  Follow Up Time: NICKI VALENZUELA  Internal Medicine  [address] [place] [address]  Phone: [phone]  Fax: [phone]  Follow Up Time:     Clarence Arroyo  CARDIOVASCULAR DISEASE  [address] [facility] [address]  [place] [address]  Phone: [phone]  Fax: [phone]  Follow Up Time:     Suzie Smith (NP; RN)  NP in [facility]  [address] [place] [address] [address]  [place] [address]  Phone: [phone]  Fax: [phone]  Follow Up Time:

## 2020-07-17 NOTE — DISCHARGE NOTE PROVIDER - NSDCMRMEDTOKEN_GEN_ALL_CORE_FT
amLODIPine 10 mg oral tablet: 1 tab(s) orally once a day  aspirin 81 mg oral delayed release tablet: 1 tab(s) orally once a day  atorvastatin 80 mg oral tablet: 1 tab(s) orally once a day (at bedtime)  labetalol 200 mg oral tablet: 1 tab(s) orally once a day  potassium chloride 10 mEq oral tablet, extended release: 1 tab(s) orally once a day amLODIPine 10 mg oral tablet: 1 tab(s) orally once a day  aspirin 81 mg oral delayed release tablet: 1 tab(s) orally once a day  atorvastatin 80 mg oral tablet: 1 tab(s) orally once a day (at bedtime)  clopidogrel 75 mg oral tablet: 1 tab(s) orally once a day  labetalol 200 mg oral tablet: 1 tab(s) orally once a day  potassium chloride 10 mEq oral tablet, extended release: 2 tab(s) orally once a day

## 2020-07-17 NOTE — DISCHARGE NOTE NURSING/CASE MANAGEMENT/SOCIAL WORK - NSDCFUADDAPPT_GEN_ALL_CORE_FT
Would recommend out patient follow up with ENT for scheduling of out patient FNA. Patient requesting having her PCP set her up with ENT. Can also follow up with [facility]      Upon discharge, please follow up at [address] [place] [address] with Suzie Smith NP  [phone]  Please email [email or web address] to schedule an appointment with the stroke NP for 1 week after discharge

## 2020-07-17 NOTE — DISCHARGE NOTE PROVIDER - NSDCCPCAREPLAN_GEN_ALL_CORE_FT
PRINCIPAL DISCHARGE DIAGNOSIS  Diagnosis: Cerebrovascular accident (CVA)  Assessment and Plan of Treatment: Continue physical therapy and skills learned for rehabilitation.  Follow up with your neurologist in 1-2 weeks for further medical management.  Continue to take your medications as prescribed, low salt, low fat, and diabetic diet.  Consider joining a support group for stroke survivors for emotional support to prevent depression.  s/p Loop Implant: Followup ILR results with Dr Arroyo.        SECONDARY DISCHARGE DIAGNOSES  Diagnosis: Hyperlipidemia, unspecified hyperlipidemia type  Assessment and Plan of Treatment: Low fat diet, exercise daily and continue current medications. Follow up with primary care physician and cardiologist for management.      Diagnosis: Essential hypertension  Assessment and Plan of Treatment: Low sodium and fat diet, continue anti-hypertensive medications, and follow up with primary care physician.      Diagnosis: Parotid mass  Assessment and Plan of Treatment: Would recommend out patient follow up with ENT for scheduling of out patient FNA. Patient requesting having her PCP set her up with ENT. Can also follow up with [facility]  PRINCIPAL DISCHARGE DIAGNOSIS  Diagnosis: Cerebrovascular accident (CVA)  Assessment and Plan of Treatment: Continue physical therapy and skills learned for rehabilitation.  Follow up with your neurologist in 1-2 weeks for further medical management.  Continue to take your medications as prescribed, low salt, low fat, and diabetic diet.  Consider joining a support group for stroke survivors for emotional support to prevent depression.  s/p Loop Implant: Followup ILR results with Dr Arroyo in 1-2 weeks   Upon discharge, please follow up at [address] [place] [address] with Suzie Smith NP  [phone]  THey will reach put to you with an appointment or you can call above number for appointment         SECONDARY DISCHARGE DIAGNOSES  Diagnosis: Hyperlipidemia, unspecified hyperlipidemia type  Assessment and Plan of Treatment: Low fat diet, exercise daily and continue current medications. Follow up with primary care physician and cardiologist for management.      Diagnosis: Essential hypertension  Assessment and Plan of Treatment: Low sodium and fat diet, continue anti-hypertensive medications, and follow up with primary care physician.      Diagnosis: Parotid mass  Assessment and Plan of Treatment: Would recommend out patient follow up with ENT for scheduling of out patient FNA. Patient requesting having her PCP set her up with ENT. Can also follow up with [facility]

## 2020-07-17 NOTE — DISCHARGE NOTE PROVIDER - PROVIDER TOKENS
PROVIDER:[TOKEN:[[id number]]],PROVIDER:[TOKEN:[[id number]]] PROVIDER:[TOKEN:[[id number]]],PROVIDER:[TOKEN:[[id number]]],PROVIDER:[TOKEN:[[id number]]]

## 2020-07-17 NOTE — PROGRESS NOTE ADULT - PROVIDER SPECIALTY LIST ADULT
Cardiology
Cardiology
Electrophysiology
Internal Medicine
Internal Medicine
Neurology

## 2020-07-17 NOTE — PROGRESS NOTE ADULT - SUBJECTIVE AND OBJECTIVE BOX
Patient is a 69y old  Female who presents with a chief complaint of CVA (17 Jul 2020 12:19)      INTERVAL HISTORY: feels ok     MEDICATIONS:  amLODIPine   Tablet 10 milliGRAM(s) Oral at bedtime  labetalol 200 milliGRAM(s) Oral daily        PHYSICAL EXAM:  T(C): 36.9 (07-17-20 @ 13:00), Max: 36.9 (07-17-20 @ 05:58)  HR: 80 (07-17-20 @ 13:00) (61 - 80)  BP: 148/78 (07-17-20 @ 13:00) (137/74 - 159/72)  RR: 18 (07-17-20 @ 13:00) (17 - 18)  SpO2: 99% (07-17-20 @ 13:00) (98% - 100%)  Wt(kg): --  I&O's Summary        Appearance: In no distress	  HEENT:    PERRL, EOMI	  Cardiovascular:  S1 S2, No JVD  Respiratory: Lungs clear to auscultation	  Gastrointestinal:  Soft, Non-tender, + BS	  Vascularature:  No edema of LE  Psychiatric: Appropriate affect   Neuro: no acute focal deficits                               12.3   3.96  )-----------( 187      ( 17 Jul 2020 05:20 )             36.7     07-17    140  |  102  |  22  ----------------------------<  95  3.2<L>   |  27  |  1.07    Ca    9.6      17 Jul 2020 05:20  Phos  3.9     07-17  Mg     2.1     07-17          Labs personally reviewed    Assessment and Plan:   Assessment:  · Assessment		  This is a 70yo Female who ambulates without assistance w/ PMHx of CVA (2011- residual right sided hemiparesis, right sided facial droop and dysarthria) who presented with slurred speech and aphasia. Admitted for CVA work-up.     Problem/Plan - 1:  ·  Problem: Cerebrovascular accident (CVA), unspecified mechanism.  Plan: - Neuro c/s appreciated   - Plavix load 300mg stat now, then 75qd   - MRI brain w/o contrast ordered   - TTE w/ bubble study   - permissive HTN   - ASA 81  - Lipitor 80mg  - s/p ILR    Problem/Plan - 2:  ·  Problem: Essential hypertension.  Plan: - BP meds on hold for permissive HTN, add back gradually after 24-48hrs hrs.   - monitor BP   - DASH/TLC diet.     Problem/Plan - 3:  ·  Problem: Hyperlipidemia, unspecified hyperlipidemia type.  Plan: - Continue statin, titrate down pending lipid profile   - lipid profile   - Low fat/ low cholesterol diet.     Problem/Plan - 4:  ·  Problem: Need for prophylactic measure.  Plan: DVT ppx: Sub q heparin.     i will be monitoring her ILR      Clarence Arroyo DO [facility]  Cardiovascular Medicine  [address] [place] [address], [address]  Office: [phone]  Cell: [phone]

## 2020-07-21 ENCOUNTER — RECORD ABSTRACTING (OUTPATIENT)
Age: 70
End: 2020-07-21

## 2020-07-22 ENCOUNTER — APPOINTMENT (OUTPATIENT)
Dept: NEUROLOGY | Facility: CLINIC | Age: 70
End: 2020-07-22
Payer: MEDICARE

## 2020-07-22 DIAGNOSIS — I10 ESSENTIAL (PRIMARY) HYPERTENSION: ICD-10-CM

## 2020-07-22 DIAGNOSIS — I69.322 DYSARTHRIA FOLLOWING CEREBRAL INFARCTION: ICD-10-CM

## 2020-07-22 DIAGNOSIS — I63.9 CEREBRAL INFARCTION, UNSPECIFIED: ICD-10-CM

## 2020-07-22 DIAGNOSIS — E78.5 HYPERLIPIDEMIA, UNSPECIFIED: ICD-10-CM

## 2020-07-22 DIAGNOSIS — R29.810 FACIAL WEAKNESS: ICD-10-CM

## 2020-07-22 DIAGNOSIS — G81.91 HEMIPLEGIA, UNSPECIFIED AFFECTING RIGHT DOMINANT SIDE: ICD-10-CM

## 2020-07-22 DIAGNOSIS — Z82.3 FAMILY HISTORY OF STROKE: ICD-10-CM

## 2020-07-22 PROCEDURE — 99204 OFFICE O/P NEW MOD 45 MIN: CPT | Mod: 95

## 2020-07-22 RX ORDER — CLOPIDOGREL BISULFATE 75 MG/1
75 TABLET, FILM COATED ORAL DAILY
Qty: 21 | Refills: 0 | Status: ACTIVE | COMMUNITY
Start: 2020-07-22

## 2020-09-15 ENCOUNTER — RECORD ABSTRACTING (OUTPATIENT)
Age: 70
End: 2020-09-15

## 2020-10-21 ENCOUNTER — NON-APPOINTMENT (OUTPATIENT)
Age: 70
End: 2020-10-21

## 2020-11-19 ENCOUNTER — NON-APPOINTMENT (OUTPATIENT)
Age: 70
End: 2020-11-19

## 2021-04-21 NOTE — SWALLOW BEDSIDE ASSESSMENT ADULT - ASR SWALLOW LINGUAL MOBILITY
Patient presents today for her annual exam.      Her last menses started none and she reports her cycles to be absent.  Currently she is using vasectomy  for birth control and is satisfied..     PAP:    had her last pap and HPV on 6/21/18 and it was negative.  Mammogram:   had a mammogram 3/31/21.  Bone Density:  has not had a bone mineral density test.    Latex:  Patient denies allergy to latex.  Medications reviewed with patient.  Tobacco use verified.  Allergies verified.    Primary care provider - Darryn Parada MD    .    Patient would like communication of their results via:    [facility].    Patient's current [facility] status: Active.     Would you like STD screening:  No    Chaperone needed:  no     within functional limits

## 2022-11-14 NOTE — PROVIDER CONTACT NOTE (OTHER) - ACTION/TREATMENT ORDERED:
[facility] - Periop Services  Discharge Note  Short Stay    Procedure(s) (LRB):  DEBRIDEMENT, WOUND (N/A)      OUTCOME: Patient tolerated treatment/procedure well without complication and is now ready for discharge.    DISPOSITION: Home or Self Care    FINAL DIAGNOSIS:  Pilonidal cyst    FOLLOWUP: In clinic    DISCHARGE INSTRUCTIONS:    Discharge Procedure Orders   Diet general     Remove dressing in 48 hours     Call MD for:  temperature >100.4     Call MD for:  persistent nausea and vomiting     Call MD for:  severe uncontrolled pain     Call MD for:  difficulty breathing, headache or visual disturbances     Wound care routine (specify)   Order Comments: Wound care routine: wash gently with soap and water, daily, after removing bandage (in 2 days).     Shower on day dressing removed (No bath)        TIME SPENT ON DISCHARGE: 10 minutes  
Vitals and stroke assessment ordered every 4 hours. As per PA hold bedtime amlodipine as patient is currently permissive hypertensive for 24-48 hours.
Continue to monitor. Permissive hypertension.
Patient NPO labetalol held. Vitals ordered q4h. Stroke q4h.
Vitals ordered q4h.

## 2025-01-24 NOTE — ED ADULT NURSE NOTE - TEMPLATE LIST FOR HEAD TO TOE ASSESSMENT
Orthopedic Per chart, Pt is a 79 y/o F with PMH: "pancreatic cancer s/p Whipple in 2021, pulmonary mets s/p L VATS, TONIA wedge resection and LLL basilar segmentectomy in 11/2024, L partial nephrectomy (2019), HTN, RA, presenting with fevers for the last 2 days."